# Patient Record
Sex: MALE | Race: WHITE | NOT HISPANIC OR LATINO
[De-identification: names, ages, dates, MRNs, and addresses within clinical notes are randomized per-mention and may not be internally consistent; named-entity substitution may affect disease eponyms.]

---

## 2020-03-06 PROBLEM — Z86.69 HISTORY OF VIRAL CONJUNCTIVITIS: Status: RESOLVED | Noted: 2020-03-06 | Resolved: 2020-03-06

## 2020-03-06 PROBLEM — Z87.19 HISTORY OF IRRITABLE BOWEL SYNDROME: Status: RESOLVED | Noted: 2020-03-06 | Resolved: 2020-03-06

## 2020-03-06 PROBLEM — D89.2 HYPERGAMMAGLOBULINEMIA: Status: RESOLVED | Noted: 2020-03-06 | Resolved: 2020-03-06

## 2020-03-06 PROBLEM — K44.9 DIAPHRAGMATIC HERNIA: Status: RESOLVED | Noted: 2020-03-06 | Resolved: 2020-03-06

## 2020-03-06 PROBLEM — H25.10 SENILE NUCLEAR SCLEROSIS: Status: RESOLVED | Noted: 2020-03-06 | Resolved: 2020-03-06

## 2020-03-06 PROBLEM — E88.09 HYPERPROTEINEMIA: Status: RESOLVED | Noted: 2020-03-06 | Resolved: 2020-03-06

## 2020-03-06 PROBLEM — Z86.79 HISTORY OF ISCHEMIC HEART DISEASE: Status: RESOLVED | Noted: 2020-03-06 | Resolved: 2020-03-06

## 2020-03-06 PROBLEM — Z87.2 HISTORY OF PSORIASIS: Status: RESOLVED | Noted: 2020-03-06 | Resolved: 2020-03-06

## 2020-03-06 PROBLEM — R74.8 ELEVATED CK: Status: RESOLVED | Noted: 2020-03-06 | Resolved: 2020-03-06

## 2020-03-06 PROBLEM — Z86.39 HISTORY OF HYPERLIPIDEMIA: Status: RESOLVED | Noted: 2020-03-06 | Resolved: 2020-03-06

## 2020-03-06 PROBLEM — Z86.2 HISTORY OF ANEMIA: Status: RESOLVED | Noted: 2020-03-06 | Resolved: 2020-03-06

## 2020-03-06 PROBLEM — Z00.00 ENCOUNTER FOR PREVENTIVE HEALTH EXAMINATION: Status: ACTIVE | Noted: 2020-03-06

## 2020-03-06 PROBLEM — Z86.39 HISTORY OF VITAMIN D DEFICIENCY: Status: RESOLVED | Noted: 2020-03-06 | Resolved: 2020-03-06

## 2020-03-06 PROBLEM — Z87.898 HISTORY OF ELEVATED PROSTATE SPECIFIC ANTIGEN (PSA): Status: RESOLVED | Noted: 2020-03-06 | Resolved: 2020-03-06

## 2020-03-06 PROBLEM — Z87.898 HISTORY OF INSOMNIA: Status: RESOLVED | Noted: 2020-03-06 | Resolved: 2020-03-06

## 2020-03-06 PROBLEM — H04.129 DRY EYE: Status: RESOLVED | Noted: 2020-03-06 | Resolved: 2020-03-06

## 2020-03-09 ENCOUNTER — APPOINTMENT (OUTPATIENT)
Dept: RADIATION ONCOLOGY | Facility: CLINIC | Age: 85
End: 2020-03-09
Payer: MEDICARE

## 2020-03-09 VITALS
RESPIRATION RATE: 12 BRPM | HEIGHT: 69 IN | TEMPERATURE: 97.2 F | DIASTOLIC BLOOD PRESSURE: 76 MMHG | HEART RATE: 54 BPM | WEIGHT: 196 LBS | SYSTOLIC BLOOD PRESSURE: 160 MMHG | BODY MASS INDEX: 29.03 KG/M2 | OXYGEN SATURATION: 99 %

## 2020-03-09 DIAGNOSIS — Z86.79 PERSONAL HISTORY OF OTHER DISEASES OF THE CIRCULATORY SYSTEM: ICD-10-CM

## 2020-03-09 DIAGNOSIS — Z87.891 PERSONAL HISTORY OF NICOTINE DEPENDENCE: ICD-10-CM

## 2020-03-09 DIAGNOSIS — H25.10 AGE-RELATED NUCLEAR CATARACT, UNSPECIFIED EYE: ICD-10-CM

## 2020-03-09 DIAGNOSIS — K44.9 DIAPHRAGMATIC HERNIA W/OUT OBSTRUCTION OR GANGRENE: ICD-10-CM

## 2020-03-09 DIAGNOSIS — Z87.2 PERSONAL HISTORY OF DISEASES OF THE SKIN AND SUBCUTANEOUS TISSUE: ICD-10-CM

## 2020-03-09 DIAGNOSIS — D89.2 HYPERGAMMAGLOBULINEMIA, UNSPECIFIED: ICD-10-CM

## 2020-03-09 DIAGNOSIS — Z86.2 PERSONAL HISTORY OF DISEASES OF THE BLOOD AND BLOOD-FORMING ORGANS AND CERTAIN DISORDERS INVOLVING THE IMMUNE MECHANISM: ICD-10-CM

## 2020-03-09 DIAGNOSIS — Z86.39 PERSONAL HISTORY OF OTHER ENDOCRINE, NUTRITIONAL AND METABOLIC DISEASE: ICD-10-CM

## 2020-03-09 DIAGNOSIS — Z86.69 PERSONAL HISTORY OF OTHER DISEASES OF THE NERVOUS SYSTEM AND SENSE ORGANS: ICD-10-CM

## 2020-03-09 DIAGNOSIS — Z87.19 PERSONAL HISTORY OF OTHER DISEASES OF THE DIGESTIVE SYSTEM: ICD-10-CM

## 2020-03-09 DIAGNOSIS — E88.09 OTHER DISORDERS OF PLASMA-PROTEIN METABOLISM, NOT ELSEWHERE CLASSIFIED: ICD-10-CM

## 2020-03-09 DIAGNOSIS — R74.8 ABNORMAL LEVELS OF OTHER SERUM ENZYMES: ICD-10-CM

## 2020-03-09 DIAGNOSIS — H04.129 DRY EYE SYNDROME OF UNSPECIFIED LACRIMAL GLAND: ICD-10-CM

## 2020-03-09 DIAGNOSIS — Z87.898 PERSONAL HISTORY OF OTHER SPECIFIED CONDITIONS: ICD-10-CM

## 2020-03-09 DIAGNOSIS — Z78.9 OTHER SPECIFIED HEALTH STATUS: ICD-10-CM

## 2020-03-09 PROCEDURE — 99024 POSTOP FOLLOW-UP VISIT: CPT

## 2020-03-09 PROCEDURE — 99213 OFFICE O/P EST LOW 20 MIN: CPT

## 2020-03-09 NOTE — HISTORY OF PRESENT ILLNESS
[FreeTextEntry1] : 09/09/19 FOLLOW UP NOTE- Dr. Miranda\par \par SUMMARY:  The patient is an 84-year-old  male with adenocarcinoma of\par the prostate, clinical stage T2b N0 M0, initial PSA 13 and Alta score of\par 4+3=7, who was treated with a combined adjuvant brachytherapy along with\par radiation therapy to a dose of 8100 cGy which he completed in February of 2019.\par The patient continues to get adjuvant hormonal therapy. He denies any history\par of hot flashes. He notes that he begins to have slightly increased frequency of\par urination with nocturia x2. He denies history of hematuria. No history of\par abdominal discomfort or rectal bleed. The patient has his PSA repeated two\par weeks ago, that was undetectable. \par \par PHYSICAL EXAMINATION: Adult make, well built and nourished, not in distress.\par Vitals stable. Performance status 80. Pain score 0. HEENT: No pallor. No\par icterus. PERRLA. Neck: No neck nodes. Chest: Lungs clear on auscultation. S1\par and S2 heard normally. Abdomen: Soft, nontender. No hepatomegaly. Rectal\par examination: Good sphincter tone. Prostate small, firm, nontender, no palpable\par nodules. Rectal: Mucosa smooth. No blood on examining finger. Musculoskeletal\par system: No bony tenderness noted.\par \par ASSESSMENT AND PLAN: Adenocarcinoma of the prostate, clinical stage T2b, status\par post hormonal therapy and radiation therapy completed six months ago. The\par patient has minimal symptoms and remains free of disease. He will continue his\par follow up with WARREN D. BROMBERG, MD, [facility]. We will see him in follow up\par clinic again in six months with repeat PSA.  \par __\par Received a dose of 4600 cGy in twenty-three 200 cGy fractions to the\par pelvis over 31 elapsed days from January 4, 2019 to February 4, 2019.\par Thereafter, he received a boost to the prostate using external beam\par radiation in seventeen 200 cGy fractions over 23 elapsed days from February 5,\par 2019 to February 28, 2019 to 3400 cGy, thus making the total dose to the\par prostate 8000 cGy. \par \par Patient returns for follow-up visit today.  He denies any history of hematuria or burning sensation during urination.  He has minimally increased frequency of urination with nocturia x1.  Patient does wear 1 pad in the morning.  Denies history of abdominal discomfort or rectal bleed\par \par \par PSA drawn today 3/9/20\par \par Prostate cancer urinary activity score- 11\par EPIC-CP score- 6\par \par Global fatigue score:4.6\par Fatigue Interference score:1 \par Fatigue severity:2.2\par

## 2020-03-09 NOTE — VITALS
[Maximal Pain Intensity: 0/10] : 0/10 [Least Pain Intensity: 0/10] : 0/10 [NoTreatment Scheduled] : no treatment scheduled [80: Active, but tires more quickly] : 80: Active, but tires more quickly [ECOG Performance Status: 2 - Ambulatory and capable of all self care but unable to carry out any work activities] : Performance Status: 2 - Ambulatory and capable of all self care but unable to carry out any work activities. Up and about more than 50% of waking hours

## 2020-03-09 NOTE — PHYSICAL EXAM
[Normal] : oriented to person, place and time, the affect was normal, the mood was normal and not anxious [FreeTextEntry1] : No blood on examining finger

## 2020-03-09 NOTE — REVIEW OF SYSTEMS
[Hematuria: Grade 0] : Hematuria: Grade 0 [Urinary Incontinence: Grade 1 - Occasional (e.g., with coughing, sneezing, etc.), pads not indicated] : Urinary Incontinence: Grade 1 - Occasional (e.g., with coughing, sneezing, etc.), pads not indicated [Urinary Retention: Grade 0] : Urinary Retention: Grade 0 [Urinary Tract Pain: Grade 0] : Urinary Tract Pain: Grade 0 [Urinary Urgency: Grade 1 - Present] : Urinary Urgency: Grade 1 - Present [Urinary Frequency: Grade 1 - Present] : Urinary Frequency: Grade 1 - Present [FreeTextEntry7] : not sexually active

## 2020-09-14 ENCOUNTER — APPOINTMENT (OUTPATIENT)
Dept: RADIATION ONCOLOGY | Facility: CLINIC | Age: 85
End: 2020-09-14
Payer: MEDICARE

## 2020-09-14 VITALS
SYSTOLIC BLOOD PRESSURE: 148 MMHG | TEMPERATURE: 98 F | RESPIRATION RATE: 12 BRPM | BODY MASS INDEX: 28.44 KG/M2 | OXYGEN SATURATION: 99 % | WEIGHT: 192 LBS | HEART RATE: 60 BPM | HEIGHT: 69 IN | DIASTOLIC BLOOD PRESSURE: 74 MMHG

## 2020-09-14 DIAGNOSIS — Z92.3 PERSONAL HISTORY OF IRRADIATION: ICD-10-CM

## 2020-09-14 PROCEDURE — 99213 OFFICE O/P EST LOW 20 MIN: CPT

## 2020-09-14 NOTE — HISTORY OF PRESENT ILLNESS
[FreeTextEntry1] : Mr. Garcia is a 85 year old made with adenocarcinoma of the prostate, clinical stage T2b, Alta score 7 and a pretreatment PSA of 13. He received a dose of 4600 cGy in twenty-three 200 cGy fractions from January 4, 2019 to February 4, 2019. Thereafter, he received a boost to the prostate using external beam radiation in 17 fractions of  200 cGy fractions from February 5, 2019 to February 28, 2019 to 3400 cGy, thus making the total dose to the prostate 8000 cGy. \par \par He was last seen in our office on 3/9/2020 and he was clinically doing well. PSA on 3/9/20 was  0.02. He is here today for a follow up. He states he is having frequency and urgency. His sleep is interrupted due to nocturia x4 per night. Dr. Bromberg is his urologist and Dr. Song (primary MD) started him on Flomax was started 2 months ago, he stopped it and has now restarted it.\par Patient continues to take care of his wife who has dementia and is legally blind at home, he was recently able to get a live in aide to assist them with care. \par \par PSA was drawn today \par \par Prostate cancer urinary activity score- 13\par EPIC-CP score- 16\par \par

## 2020-09-14 NOTE — VITALS
[Maximal Pain Intensity: 0/10] : 0/10 [Least Pain Intensity: 0/10] : 0/10 [80: Normal activity with effort; some signs or symptoms of disease.] : 80: Normal activity with effort; some signs or symptoms of disease.  [80: Active, but tires more quickly] : 80: Active, but tires more quickly [ECOG Performance Status: 1 - Restricted in physically strenuous activity but ambulatory and able to carry out work of a light or sedentary nature] : Performance Status: 1 - Restricted in physically strenuous activity but ambulatory and able to carry out work of a light or sedentary nature, e.g., light house work, office work

## 2020-09-14 NOTE — REVIEW OF SYSTEMS
[Constipation: Grade 0] : Constipation: Grade 0 [Anal Pain: Grade 0] : Anal Pain: Grade 0 [Diarrhea: Grade 0] : Diarrhea: Grade 0 [Dyspepsia: Grade 0] : Dyspepsia: Grade 0 [Esophagitis: Grade 0] : Esophagitis: Grade 0 [Fecal Incontinence: Grade 0] : Fecal Incontinence: Grade 0 [Dysphagia: Grade 0] : Dysphagia: Grade 0 [Nausea: Grade 0] : Nausea: Grade 0 [Gastroparesis: Grade 0] : Gastroparesis: Grade 0 [Rectal Pain: Grade 0] : Rectal Pain: Grade 0 [Proctitis: Grade 1 - Rectal discomfort, intervention not indicated] : Proctitis: Grade 1 - Rectal discomfort, intervention not indicated [Small Intestinal Obstruction: Grade 0] : Small Intestinal Obstruction: Grade 0 [Vomiting: Grade 0] : Vomiting: Grade 0 [Hematuria: Grade 0] : Hematuria: Grade 0 [Urinary Incontinence: Grade 1 - Occasional (e.g., with coughing, sneezing, etc.), pads not indicated] : Urinary Incontinence: Grade 1 - Occasional (e.g., with coughing, sneezing, etc.), pads not indicated [Urinary Retention: Grade 0] : Urinary Retention: Grade 0 [Urinary Urgency: Grade 1 - Present] : Urinary Urgency: Grade 1 - Present [Urinary Tract Pain: Grade 0] : Urinary Tract Pain: Grade 0 [Urinary Frequency: Grade 1 - Present] : Urinary Frequency: Grade 1 - Present [Insomnia: Grade 1 - Mild difficulty falling asleep, staying asleep or waking up early] : Insomnia: Grade 1 - Mild difficulty falling asleep, staying asleep or waking up early [de-identified] : discomfort and pressure feeling [FreeTextEntry2] : wears brief for comfort and involuntary dribbling [FreeTextEntry7] : not sexually active [FreeTextEntry3] : due to nocturia 4 times

## 2020-09-14 NOTE — DISEASE MANAGEMENT
[Clinical] : TNM Stage: c [II] : II [TTNM] : 2 [NTNM] : 0 [de-identified] : He received a dose of 4600 cGy in twenty-three 200 cGy fractions from January 4, 2019 to February 4, 2019. Thereafter, he received a boost to the prostate using external beam radiation in 17 fractions of  200 cGy fractions from February 5, 2019 to February 28, 2019 to 3400 cGy, thus making the total dose to the prostate 8000 cGy.  [MTNM] : 0

## 2020-09-14 NOTE — PHYSICAL EXAM
[Normal] : oriented to person, place and time, the affect was normal, the mood was normal and not anxious [FreeTextEntry1] : Prostate small, firm, nontender.  No palpable nodules.  No blood in examining finger

## 2021-03-15 ENCOUNTER — APPOINTMENT (OUTPATIENT)
Dept: RADIATION ONCOLOGY | Facility: CLINIC | Age: 86
End: 2021-03-15
Payer: MEDICARE

## 2021-03-15 VITALS
SYSTOLIC BLOOD PRESSURE: 164 MMHG | OXYGEN SATURATION: 100 % | DIASTOLIC BLOOD PRESSURE: 88 MMHG | TEMPERATURE: 98 F | BODY MASS INDEX: 27.85 KG/M2 | RESPIRATION RATE: 16 BRPM | HEART RATE: 60 BPM | WEIGHT: 188 LBS | HEIGHT: 69 IN

## 2021-03-15 DIAGNOSIS — Z86.79 PERSONAL HISTORY OF OTHER DISEASES OF THE CIRCULATORY SYSTEM: ICD-10-CM

## 2021-03-15 PROCEDURE — 99072 ADDL SUPL MATRL&STAF TM PHE: CPT

## 2021-03-15 PROCEDURE — 99212 OFFICE O/P EST SF 10 MIN: CPT

## 2021-03-15 NOTE — REVIEW OF SYSTEMS
[Fatigue: Grade 1 - Fatigue relieved by rest] : Fatigue: Grade 1 - Fatigue relieved by rest [Hematuria: Grade 0] : Hematuria: Grade 0 [Urinary Incontinence: Grade 1 - Occasional (e.g., with coughing, sneezing, etc.), pads not indicated] : Urinary Incontinence: Grade 1 - Occasional (e.g., with coughing, sneezing, etc.), pads not indicated [Urinary Retention: Grade 1 - Urinary, suprapubic or intermittent catheter placement not indicated; able to void with some residual] : Urinary Retention: Grade 1 - Urinary, suprapubic or intermittent catheter placement not indicated; able to void with some residual [Urinary Tract Pain: Grade 0] : Urinary Tract Pain: Grade 0 [Urinary Urgency: Grade 1 - Present] : Urinary Urgency: Grade 1 - Present [Urinary Frequency: Grade 1 - Present] : Urinary Frequency: Grade 1 - Present

## 2021-03-15 NOTE — PHYSICAL EXAM
[Normal] : normal external genitalia without lesions and no testicular masses [Normal] : oriented to person, place and time, the affect was normal, the mood was normal and not anxious [FreeTextEntry1] : Prostate small, firm, nontender.  No blood in examining finger

## 2021-03-15 NOTE — HISTORY OF PRESENT ILLNESS
[FreeTextEntry1] : Mr. Garcia is a 85 year old made with adenocarcinoma of the prostate, clinical stage T2b, Alta score 7 and a pretreatment PSA of 13. He received a dose of 4600 cGy in twenty-three 200 cGy fractions from January 4, 2019 to February 4, 2019. Thereafter, he received a boost to the prostate using external beam radiation in 17 fractions of  200 cGy fractions from February 5, 2019 to February 28, 2019 to 3400 cGy, thus making the total dose to the prostate 8000 cGy. \par \par He was last seen in our office on 3/9/2020 and he was clinically doing well. PSA on 3/9/20 was  0.02. He is here today for a follow up. He states he is having frequency and urgency. His sleep is interrupted due to nocturia x4 per night. Dr. Bromberg is his urologist and Dr. Song (primary MD) started him on Flomax was started 2 months ago, he stopped it and has now restarted it.\par Patient continues to take care of his wife who has dementia and is legally blind at home, he was recently able to get a live in aide to assist them with care. \par \par PSA was drawn today \par \par Prostate cancer urinary activity score- 13\par EPIC-CP score- 16\par \par 3/15/2021 \par Mr. Garcia presents today for a 6 month f/u. He was last seen in our office on 9/14/2020. \par Mr. Garcia saw Dr. Bromberg in the fall and PSA was < 0.01. He has since stopped the Flomax. He is here today for a follow up. He states he is having frequency and urgency. He does wear a pad for overflow but that has been consistent.  His sleep is interrupted due to nocturia x 3- 4 per night. His stream is slow to start and weak. His appetite is good and weight is stable. \par AUA- 13\par EPIC-CP- 20

## 2021-03-15 NOTE — DISEASE MANAGEMENT
[Clinical] : TNM Stage: c [II] : II [TTNM] : 2 [NTNM] : 0 [MTNM] : 0 [de-identified] : He received a dose of 4600 cGy in twenty-three 200 cGy fractions from January 4, 2019 to February 4, 2019. Thereafter, he received a boost to the prostate using external beam radiation in 17 fractions of  200 cGy fractions from February 5, 2019 to February 28, 2019 to 3400 cGy, thus making the total dose to the prostate 8000 cGy.

## 2022-12-02 RX ORDER — METOPROLOL TARTRATE 25 MG/1
25 TABLET, FILM COATED ORAL
Refills: 0 | Status: DISCONTINUED | COMMUNITY
End: 2022-12-02

## 2022-12-02 RX ORDER — AMLODIPINE BESYLATE 10 MG/1
10 TABLET ORAL
Refills: 0 | Status: DISCONTINUED | COMMUNITY
End: 2022-12-02

## 2022-12-05 ENCOUNTER — APPOINTMENT (OUTPATIENT)
Dept: HEART AND VASCULAR | Facility: CLINIC | Age: 87
End: 2022-12-05

## 2022-12-05 VITALS
BODY MASS INDEX: 25.48 KG/M2 | OXYGEN SATURATION: 95 % | HEART RATE: 74 BPM | HEIGHT: 69 IN | DIASTOLIC BLOOD PRESSURE: 70 MMHG | WEIGHT: 172 LBS | SYSTOLIC BLOOD PRESSURE: 126 MMHG

## 2022-12-05 DIAGNOSIS — I34.0 NONRHEUMATIC MITRAL (VALVE) INSUFFICIENCY: ICD-10-CM

## 2022-12-05 PROCEDURE — 99204 OFFICE O/P NEW MOD 45 MIN: CPT

## 2022-12-05 NOTE — PHYSICAL EXAM
[No Acute Distress] : no acute distress [Frail] : frail [Normal Conjunctiva] : normal conjunctiva [Normal Venous Pressure] : normal venous pressure [No Carotid Bruit] : no carotid bruit [Normal S1, S2] : normal S1, S2 [Good Air Entry] : good air entry [No Respiratory Distress] : no respiratory distress  [Soft] : abdomen soft [Non Tender] : non-tender [No Masses/organomegaly] : no masses/organomegaly [Normal Bowel Sounds] : normal bowel sounds [Abnormal Gait] : abnormal gait [No Edema] : no edema [No Cyanosis] : no cyanosis [No Clubbing] : no clubbing [No Varicosities] : no varicosities [No Rash] : no rash [No Skin Lesions] : no skin lesions [Moves all extremities] : moves all extremities [No Focal Deficits] : no focal deficits [Normal Speech] : normal speech [Alert and Oriented] : alert and oriented [Normal memory] : normal memory [de-identified] : +holosystolic murmur, irregular HR [de-identified] : +crackles at LLL>RLL

## 2022-12-05 NOTE — ASSESSMENT
[FreeTextEntry1] : Chronic diastolic CHF/severe MR/HTN/HLD\par -he appears to have some pulmonary congestions-> starting lasix 40mg daily (x5 days)\par -check labs, BNP today\par -CXR PA/lateral\par -BP relatively controlled, HR WNL\par -His mitral valve is severe and he meets indication for transcatheter repair\par -we discussed at length options for severe MR, including transcatheter mitral valve repair vs surgery vs medical management. Due to his age and comorbidities, patient appears to be a good candidate for TMVr with the MitraClip. We discussed benefits/risks of the MitraClip and will plan for transcatheter repair pending testing. Will plan for cardiac cath with mitraclip (if pt is anatomical candidate for Mitraclip).\par -MASOUD at [facility] (with Dr. Cameron), carotid US\par -TEB with Dr. Bowen\par -continue eliquis (Aflutter/fib), HR controlled\par

## 2022-12-05 NOTE — REVIEW OF SYSTEMS
[Feeling Fatigued] : feeling fatigued [Seeing Double (Diplopia)] : no diplopia [Earache] : no earache [Dyspnea on exertion] : dyspnea during exertion [Chest Discomfort] : no chest discomfort [Lower Ext Edema] : no extremity edema [Syncope] : no syncope [Cough] : no cough [Abdominal Pain] : no abdominal pain [Urinary Frequency] : no change in urinary frequency [Joint Pain] : no joint pain [Dizziness] : no dizziness [Confusion] : no confusion was observed [Easy Bleeding] : no tendency for easy bleeding

## 2022-12-05 NOTE — REASON FOR VISIT
[FreeTextEntry1] : 88 y/o M with pmhx of permanent afib/flutter (on eliquis), PD, poor functional status (uses walker for balance), HTN, HLD, chronic diastolic heart failure, severe MR who presents for SHD evaluation\par Pt endorses NYHA III symptoms\par No LE edema\par No chest pains\par Worsening TEE over past few months\par No CHF hospitalizaitons\par He lives at home and has a nurse care for his wife. He is indepenedent with ADLs.\par Uses a walker for gait/balance issues.\par \par Testing/Imaging Reviewed:\par EKG 11/2022- aflutter, irregular HR\par \par TTE 11/2022-\par -LVEF 55%\par -RV size mildly dilated, RV function mildly reduced\par -moderate AI\par -severe MR with posterior MV prolapst\par -Moderate TR, PASP 52mmHg\par \par

## 2022-12-07 LAB
ALBUMIN SERPL ELPH-MCNC: 4.5 G/DL
ALP BLD-CCNC: 101 U/L
ALT SERPL-CCNC: 5 U/L
ANION GAP SERPL CALC-SCNC: 17 MMOL/L
APTT BLD: 39.4 SEC
AST SERPL-CCNC: 23 U/L
BASOPHILS # BLD AUTO: 0.03 K/UL
BASOPHILS NFR BLD AUTO: 0.4 %
BILIRUB SERPL-MCNC: 0.6 MG/DL
BUN SERPL-MCNC: 22 MG/DL
CALCIUM SERPL-MCNC: 9.8 MG/DL
CHLORIDE SERPL-SCNC: 99 MMOL/L
CO2 SERPL-SCNC: 22 MMOL/L
CREAT SERPL-MCNC: 0.72 MG/DL
EGFR: 90 ML/MIN/1.73M2
EOSINOPHIL # BLD AUTO: 0.05 K/UL
EOSINOPHIL NFR BLD AUTO: 0.7 %
GLUCOSE SERPL-MCNC: 120 MG/DL
HCT VFR BLD CALC: 38.7 %
HGB BLD-MCNC: 12.7 G/DL
IMM GRANULOCYTES NFR BLD AUTO: 0.4 %
INR PPP: 1.54 RATIO
LYMPHOCYTES # BLD AUTO: 0.52 K/UL
LYMPHOCYTES NFR BLD AUTO: 7.5 %
MAN DIFF?: NORMAL
MCHC RBC-ENTMCNC: 31.2 PG
MCHC RBC-ENTMCNC: 32.8 GM/DL
MCV RBC AUTO: 95.1 FL
MONOCYTES # BLD AUTO: 0.48 K/UL
MONOCYTES NFR BLD AUTO: 6.9 %
NEUTROPHILS # BLD AUTO: 5.84 K/UL
NEUTROPHILS NFR BLD AUTO: 84.1 %
NT-PROBNP SERPL-MCNC: 2280 PG/ML
PLATELET # BLD AUTO: 237 K/UL
POTASSIUM SERPL-SCNC: 4.1 MMOL/L
PROT SERPL-MCNC: 7.1 G/DL
PT BLD: 17.9 SEC
RBC # BLD: 4.07 M/UL
RBC # FLD: 14.4 %
SODIUM SERPL-SCNC: 137 MMOL/L
WBC # FLD AUTO: 6.95 K/UL

## 2022-12-09 ENCOUNTER — NON-APPOINTMENT (OUTPATIENT)
Age: 87
End: 2022-12-09

## 2022-12-11 ENCOUNTER — RESULT REVIEW (OUTPATIENT)
Age: 87
End: 2022-12-11

## 2022-12-19 ENCOUNTER — NON-APPOINTMENT (OUTPATIENT)
Age: 87
End: 2022-12-19

## 2023-01-13 ENCOUNTER — APPOINTMENT (OUTPATIENT)
Dept: CARDIOTHORACIC SURGERY | Facility: CLINIC | Age: 88
End: 2023-01-13
Payer: MEDICARE

## 2023-01-13 PROCEDURE — 99202 OFFICE O/P NEW SF 15 MIN: CPT

## 2023-01-18 NOTE — PHYSICAL EXAM
[No Acute Distress] : no acute distress [Frail] : frail [Normal Conjunctiva] : normal conjunctiva [Normal Venous Pressure] : normal venous pressure [No Carotid Bruit] : no carotid bruit [Normal S1, S2] : normal S1, S2 [Good Air Entry] : good air entry [No Respiratory Distress] : no respiratory distress  [Soft] : abdomen soft [Non Tender] : non-tender [No Masses/organomegaly] : no masses/organomegaly [Normal Bowel Sounds] : normal bowel sounds [Abnormal Gait] : abnormal gait [No Edema] : no edema [No Cyanosis] : no cyanosis [No Clubbing] : no clubbing [No Varicosities] : no varicosities [No Rash] : no rash [No Skin Lesions] : no skin lesions [Moves all extremities] : moves all extremities [No Focal Deficits] : no focal deficits [Normal Speech] : normal speech [Alert and Oriented] : alert and oriented [Normal memory] : normal memory [de-identified] : +holosystolic murmur, irregular HR [de-identified] : +crackles at LLL>RLL

## 2023-01-18 NOTE — HISTORY OF PRESENT ILLNESS
[FreeTextEntry1] : 88 y/o M with pmhx of permanent afib/flutter (on eliquis), Parkinson's disease, poor functional status (uses walker for balance), HTN, HLD, chronic diastolic heart failure, severe MR who presents for surgical evaluation. \par \par Pt endorses NYHA III symptoms\par Worsening TEE over past few months\par \par TTE 11/2022-\par -LVEF 55%\par -RV size mildly dilated, RV function mildly reduced\par -moderate AI\par -severe MR with posterior MV prolapse\par -Moderate TR, PASP 52mmg\par \par MASOUD on 12/22/2022- 1. Left ventricular systolic function is normal without regional wall motion abnormalities.\par 2. Severely dilated left atrium.3. No evidence of left atrial or left atrial appendage thrombus. The left atrialappendage emptying velocity is low at 22 cm/s.4. Agitated saline injection was negative for intracardiac shunt.5. 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 Prolapse of the middle scallop (P2) of the posterior mitral leaflet.7. Severe mitral valve regurgitation at a blood pressure of 160/100 mmHg.8. 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 Eccentric anteriorly directed wall hugging mitral jet. EROA 48 cm2.  ml.10. Aortic root at sinuses of Valsalva is mildly dilated.11. Moderate aortic regurgitation.12. Mild tricuspid regurgitation.13. Estimated pulmonary artery systolic pressure is 38 mmHg, consistent with there is borderline pulmonary hypertension.\par \par The patient lives at home, however does have a home health aide \par

## 2023-01-20 ENCOUNTER — NON-APPOINTMENT (OUTPATIENT)
Age: 88
End: 2023-01-20

## 2023-01-23 ENCOUNTER — LABORATORY RESULT (OUTPATIENT)
Age: 88
End: 2023-01-23

## 2023-01-25 ENCOUNTER — NON-APPOINTMENT (OUTPATIENT)
Age: 88
End: 2023-01-25

## 2023-01-25 VITALS
OXYGEN SATURATION: 96 % | DIASTOLIC BLOOD PRESSURE: 91 MMHG | HEART RATE: 78 BPM | TEMPERATURE: 97 F | SYSTOLIC BLOOD PRESSURE: 177 MMHG | HEIGHT: 68 IN | RESPIRATION RATE: 20 BRPM | WEIGHT: 169.98 LBS

## 2023-01-25 NOTE — PATIENT PROFILE ADULT - FALL HARM RISK - RISK INTERVENTIONS

## 2023-01-25 NOTE — PRE-OP CHECKLIST - SELECT TESTS ORDERED
COVID-19 echo/CBC/PT/PTT/INR/Type and Screen/COVID-19 echo/CBC/PT/PTT/INR/Type and Screen/EKG/COVID-19

## 2023-01-26 ENCOUNTER — INPATIENT (INPATIENT)
Facility: HOSPITAL | Age: 88
LOS: 1 days | Discharge: HOME | DRG: 267 | End: 2023-01-28
Attending: INTERNAL MEDICINE | Admitting: INTERNAL MEDICINE
Payer: MEDICARE

## 2023-01-26 ENCOUNTER — APPOINTMENT (OUTPATIENT)
Dept: CARDIOTHORACIC SURGERY | Facility: HOSPITAL | Age: 88
End: 2023-01-26

## 2023-01-26 DIAGNOSIS — Z90.89 ACQUIRED ABSENCE OF OTHER ORGANS: Chronic | ICD-10-CM

## 2023-01-26 LAB
ALBUMIN SERPL ELPH-MCNC: 4.1 G/DL — SIGNIFICANT CHANGE UP (ref 3.3–5)
ALP SERPL-CCNC: 122 U/L — HIGH (ref 40–120)
ALT FLD-CCNC: 11 U/L — SIGNIFICANT CHANGE UP (ref 10–45)
ANION GAP SERPL CALC-SCNC: 10 MMOL/L — SIGNIFICANT CHANGE UP (ref 5–17)
ANION GAP SERPL CALC-SCNC: 10 MMOL/L — SIGNIFICANT CHANGE UP (ref 5–17)
APTT BLD: 38.2 SEC — HIGH (ref 27.5–35.5)
AST SERPL-CCNC: 21 U/L — SIGNIFICANT CHANGE UP (ref 10–40)
BILIRUB SERPL-MCNC: 0.7 MG/DL — SIGNIFICANT CHANGE UP (ref 0.2–1.2)
BUN SERPL-MCNC: 18 MG/DL — SIGNIFICANT CHANGE UP (ref 7–23)
BUN SERPL-MCNC: 19 MG/DL — SIGNIFICANT CHANGE UP (ref 7–23)
CALCIUM SERPL-MCNC: 9.6 MG/DL — SIGNIFICANT CHANGE UP (ref 8.4–10.5)
CALCIUM SERPL-MCNC: 9.7 MG/DL — SIGNIFICANT CHANGE UP (ref 8.4–10.5)
CHLORIDE SERPL-SCNC: 100 MMOL/L — SIGNIFICANT CHANGE UP (ref 96–108)
CHLORIDE SERPL-SCNC: 102 MMOL/L — SIGNIFICANT CHANGE UP (ref 96–108)
CO2 SERPL-SCNC: 25 MMOL/L — SIGNIFICANT CHANGE UP (ref 22–31)
CO2 SERPL-SCNC: 26 MMOL/L — SIGNIFICANT CHANGE UP (ref 22–31)
CREAT SERPL-MCNC: 0.64 MG/DL — SIGNIFICANT CHANGE UP (ref 0.5–1.3)
CREAT SERPL-MCNC: 0.66 MG/DL — SIGNIFICANT CHANGE UP (ref 0.5–1.3)
EGFR: 92 ML/MIN/1.73M2 — SIGNIFICANT CHANGE UP
EGFR: 93 ML/MIN/1.73M2 — SIGNIFICANT CHANGE UP
GLUCOSE BLDC GLUCOMTR-MCNC: 97 MG/DL — SIGNIFICANT CHANGE UP (ref 70–99)
GLUCOSE SERPL-MCNC: 100 MG/DL — HIGH (ref 70–99)
GLUCOSE SERPL-MCNC: 103 MG/DL — HIGH (ref 70–99)
HCT VFR BLD CALC: 40.2 % — SIGNIFICANT CHANGE UP (ref 39–50)
HGB BLD-MCNC: 13.6 G/DL — SIGNIFICANT CHANGE UP (ref 13–17)
INR BLD: 1.19 — HIGH (ref 0.88–1.16)
MCHC RBC-ENTMCNC: 31.6 PG — SIGNIFICANT CHANGE UP (ref 27–34)
MCHC RBC-ENTMCNC: 33.8 GM/DL — SIGNIFICANT CHANGE UP (ref 32–36)
MCV RBC AUTO: 93.3 FL — SIGNIFICANT CHANGE UP (ref 80–100)
NRBC # BLD: 0 /100 WBCS — SIGNIFICANT CHANGE UP (ref 0–0)
NT-PROBNP SERPL-SCNC: 1923 PG/ML — HIGH (ref 0–300)
PLATELET # BLD AUTO: 229 K/UL — SIGNIFICANT CHANGE UP (ref 150–400)
POTASSIUM SERPL-MCNC: 4.4 MMOL/L — SIGNIFICANT CHANGE UP (ref 3.5–5.3)
POTASSIUM SERPL-MCNC: SIGNIFICANT CHANGE UP (ref 3.5–5.3)
POTASSIUM SERPL-SCNC: 4.4 MMOL/L — SIGNIFICANT CHANGE UP (ref 3.5–5.3)
POTASSIUM SERPL-SCNC: SIGNIFICANT CHANGE UP (ref 3.5–5.3)
PROT SERPL-MCNC: 7.8 G/DL — SIGNIFICANT CHANGE UP (ref 6–8.3)
PROTHROM AB SERPL-ACNC: 14.2 SEC — HIGH (ref 10.5–13.4)
RBC # BLD: 4.31 M/UL — SIGNIFICANT CHANGE UP (ref 4.2–5.8)
RBC # FLD: 13.8 % — SIGNIFICANT CHANGE UP (ref 10.3–14.5)
RH IG SCN BLD-IMP: NEGATIVE — SIGNIFICANT CHANGE UP
SODIUM SERPL-SCNC: 136 MMOL/L — SIGNIFICANT CHANGE UP (ref 135–145)
SODIUM SERPL-SCNC: 137 MMOL/L — SIGNIFICANT CHANGE UP (ref 135–145)
WBC # BLD: 7.44 K/UL — SIGNIFICANT CHANGE UP (ref 3.8–10.5)
WBC # FLD AUTO: 7.44 K/UL — SIGNIFICANT CHANGE UP (ref 3.8–10.5)

## 2023-01-26 RX ORDER — AMANTADINE HCL 100 MG
100 CAPSULE ORAL DAILY
Refills: 0 | Status: DISCONTINUED | OUTPATIENT
Start: 2023-01-27 | End: 2023-01-27

## 2023-01-26 RX ORDER — AMANTADINE HCL 100 MG
100 CAPSULE ORAL
Refills: 0 | Status: DISCONTINUED | OUTPATIENT
Start: 2023-01-26 | End: 2023-01-26

## 2023-01-26 RX ORDER — DEXTROSE 50 % IN WATER 50 %
25 SYRINGE (ML) INTRAVENOUS
Refills: 0 | Status: DISCONTINUED | OUTPATIENT
Start: 2023-01-26 | End: 2023-01-26

## 2023-01-26 RX ORDER — TAMSULOSIN HYDROCHLORIDE 0.4 MG/1
1 CAPSULE ORAL
Qty: 0 | Refills: 0 | DISCHARGE

## 2023-01-26 RX ORDER — APIXABAN 2.5 MG/1
1 TABLET, FILM COATED ORAL
Qty: 0 | Refills: 0 | DISCHARGE

## 2023-01-26 RX ORDER — POLYETHYLENE GLYCOL 3350 17 G/17G
17 POWDER, FOR SOLUTION ORAL DAILY
Refills: 0 | Status: DISCONTINUED | OUTPATIENT
Start: 2023-01-26 | End: 2023-01-27

## 2023-01-26 RX ORDER — PANTOPRAZOLE SODIUM 20 MG/1
40 TABLET, DELAYED RELEASE ORAL DAILY
Refills: 0 | Status: DISCONTINUED | OUTPATIENT
Start: 2023-01-26 | End: 2023-01-27

## 2023-01-26 RX ORDER — CARBIDOPA AND LEVODOPA 25; 100 MG/1; MG/1
1 TABLET ORAL THREE TIMES A DAY
Refills: 0 | Status: DISCONTINUED | OUTPATIENT
Start: 2023-01-26 | End: 2023-01-26

## 2023-01-26 RX ORDER — INSULIN HUMAN 100 [IU]/ML
1 INJECTION, SOLUTION SUBCUTANEOUS
Qty: 50 | Refills: 0 | Status: DISCONTINUED | OUTPATIENT
Start: 2023-01-26 | End: 2023-01-26

## 2023-01-26 RX ORDER — PANTOPRAZOLE SODIUM 20 MG/1
40 TABLET, DELAYED RELEASE ORAL DAILY
Refills: 0 | Status: DISCONTINUED | OUTPATIENT
Start: 2023-01-26 | End: 2023-01-26

## 2023-01-26 RX ORDER — CARBIDOPA AND LEVODOPA 25; 100 MG/1; MG/1
4 TABLET ORAL THREE TIMES A DAY
Refills: 0 | Status: DISCONTINUED | OUTPATIENT
Start: 2023-01-26 | End: 2023-01-27

## 2023-01-26 RX ORDER — TRAZODONE HCL 50 MG
2 TABLET ORAL
Qty: 0 | Refills: 0 | DISCHARGE

## 2023-01-26 RX ORDER — SENNA PLUS 8.6 MG/1
2 TABLET ORAL AT BEDTIME
Refills: 0 | Status: DISCONTINUED | OUTPATIENT
Start: 2023-01-26 | End: 2023-01-27

## 2023-01-26 RX ORDER — SODIUM CHLORIDE 9 MG/ML
1000 INJECTION INTRAMUSCULAR; INTRAVENOUS; SUBCUTANEOUS
Refills: 0 | Status: DISCONTINUED | OUTPATIENT
Start: 2023-01-26 | End: 2023-01-27

## 2023-01-26 RX ORDER — OMEPRAZOLE 10 MG/1
1 CAPSULE, DELAYED RELEASE ORAL
Qty: 0 | Refills: 0 | DISCHARGE

## 2023-01-26 RX ORDER — TRAZODONE HCL 50 MG
200 TABLET ORAL AT BEDTIME
Refills: 0 | Status: DISCONTINUED | OUTPATIENT
Start: 2023-01-26 | End: 2023-01-27

## 2023-01-26 RX ORDER — CHLORHEXIDINE GLUCONATE 213 G/1000ML
5 SOLUTION TOPICAL
Refills: 0 | Status: DISCONTINUED | OUTPATIENT
Start: 2023-01-26 | End: 2023-01-26

## 2023-01-26 RX ORDER — MEPERIDINE HYDROCHLORIDE 50 MG/ML
25 INJECTION INTRAMUSCULAR; INTRAVENOUS; SUBCUTANEOUS ONCE
Refills: 0 | Status: DISCONTINUED | OUTPATIENT
Start: 2023-01-26 | End: 2023-01-26

## 2023-01-26 RX ORDER — HEPARIN SODIUM 5000 [USP'U]/ML
5000 INJECTION INTRAVENOUS; SUBCUTANEOUS EVERY 8 HOURS
Refills: 0 | Status: DISCONTINUED | OUTPATIENT
Start: 2023-01-26 | End: 2023-01-27

## 2023-01-26 RX ORDER — DEXTROSE 50 % IN WATER 50 %
50 SYRINGE (ML) INTRAVENOUS
Refills: 0 | Status: DISCONTINUED | OUTPATIENT
Start: 2023-01-26 | End: 2023-01-26

## 2023-01-26 RX ORDER — CEFAZOLIN SODIUM 1 G
2000 VIAL (EA) INJECTION EVERY 8 HOURS
Refills: 0 | Status: DISCONTINUED | OUTPATIENT
Start: 2023-01-26 | End: 2023-01-26

## 2023-01-26 RX ADMIN — HEPARIN SODIUM 5000 UNIT(S): 5000 INJECTION INTRAVENOUS; SUBCUTANEOUS at 14:58

## 2023-01-26 RX ADMIN — Medication 200 MILLIGRAM(S): at 22:22

## 2023-01-26 RX ADMIN — PANTOPRAZOLE SODIUM 40 MILLIGRAM(S): 20 TABLET, DELAYED RELEASE ORAL at 14:58

## 2023-01-26 RX ADMIN — HEPARIN SODIUM 5000 UNIT(S): 5000 INJECTION INTRAVENOUS; SUBCUTANEOUS at 22:22

## 2023-01-26 RX ADMIN — CARBIDOPA AND LEVODOPA 4 TABLET(S): 25; 100 TABLET ORAL at 22:22

## 2023-01-26 NOTE — PROGRESS NOTE ADULT - ASSESSMENT
88 y/o M with pmhx of afib/flutter (on eliquis), Parkinson's disease, poor functional status (uses walker for balance), HTN, HLD, chronic diastolic heart failure, severe MR. Patient is planned to undergo a LHC and mitral clip tomorrow with Dr. Archibald.     Plan:    Neurovascular: Hx parkinsons disease   - Continue Sinemet, Amantadine, Trazadone  -No delirium.   - No pain     Cardiovascular: Chronic diastolic heart failure, severe MR  -Pre-op Mitral clip and LHC tomorrow   - hx of afib: rate controlled, holding Eliquis for OR tomorrow   -Hemodynamically stable. HR controlled.  -Continue to monitor HR, BP, telemetry      Respiratory: Stable  -Oxygenating well on RA 98%  -Encourage coughing and use of IS 10x / hr while awake.    GI: Stable  -PPX: Protonix  -PO Diet: DASH/TLC, NPO after midnight   -Bowel regimen: Miralax    Renal / : stable  -Monitor renal function.  -Monitor I/O's.  -BUN/Cr: 18/0.66    Endocrine:  No hx of DM or thyroid disease     Hematologic: stable  -H/H: 13.6 & 40.2  -Coagulation Panel: PT 14.2 aptt 38.2 INR 1.19    ID: stable  -Temperature: afebrile  -WBC: 7.44  -Observe for SIRS/Sepsis Syndrome.    Prophylaxis:  -DVT prophylaxis with 5000 SubQ Heparin q8h  -Continue with SCD's    Disposition:  OR tomorrow

## 2023-01-26 NOTE — H&P ADULT - ASSESSMENT
88 y/o M with pmhx of afib/flutter (on eliquis), Parkinson's disease, poor functional status (uses walker for balance), HTN, HLD, chronic diastolic heart failure, severe MR.    Pt endorses NYHA III symptoms  Worsening TEE over past few months    TTE 11/2022-  -LVEF 55%  -RV size mildly dilated, RV function mildly reduced  -moderate AI  -severe MR with posterior MV prolapse  -Moderate TR, PASP 52mmg    MASOUD on 12/22/2022- 1. Left ventricular systolic function is normal without regional wall motion abnormalities.  2. Severely dilated left atrium.3. No evidence of left atrial or left atrial appendage thrombus. The left atrialappendage emptying velocity is low at 22 cm/s.4. Agitated saline injection was negative for intracardiac shunt.5. 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 Prolapse of the middle scallop (P2) of the posterior mitral leaflet.7. Severe mitral valve regurgitation at a blood pressure of 160/100 mmHg.8. 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 Eccentric anteriorly directed wall hugging mitral jet. EROA 48 cm2.  ml.10. Aortic root at sinuses of Valsalva is mildly dilated.11. Moderate aortic regurgitation.12. Mild tricuspid regurgitation.13. Estimated pulmonary artery systolic pressure is 38 mmHg, consistent with there is borderline pulmonary hypertension.    The patient lives at home, however does have a home health aide       Patient seen in same day holding area; Reports no changes to PMHx or medications since last seen by our team. Denies acute or current SOB, chest pain, palpitation, N/V/D, fever/chills, recent illness, or any other concerning symptoms.     Admit under Dr. Archibald via same day surgery. Consent signed, placed on chart.  Risks/benefits reviewed, patient understands and agrees. T&S ordered and blood products placed on hold for OR.  To 9 ICU post-op.

## 2023-01-26 NOTE — H&P ADULT - NSICDXPASTMEDICALHX_GEN_ALL_CORE_FT
PAST MEDICAL HISTORY:  Atrial fibrillation and flutter     Chronic diastolic congestive heart failure     HLD (hyperlipidemia)     HTN (hypertension)     MR (mitral regurgitation)     Parkinson disease

## 2023-01-26 NOTE — H&P ADULT - NSHPPHYSICALEXAM_GEN_ALL_CORE
Physical Exam  CONSTITUTIONAL:      Sitting comfortably in bed, NAD  NEURO:  AAOx3, no neuro deficits, CN grossly intact                   EYES:    WNL  ENMT:           WNL  CV:    S1S2, irregular   RESPIRATORY:   CTA b/l, no w/r/r  GI: +BS, soft, nd/nd  : No garduno, deferred  MUSKULOSKELETAL:   WWP, no edema, no calf tenderness, palpable peripheral pulses b/l   SKIN / BREAST:        WNL

## 2023-01-26 NOTE — H&P ADULT - HISTORY OF PRESENT ILLNESS
86 y/o M with pmhx of afib/flutter (on eliquis), Parkinson's disease, poor functional status (uses walker for balance), HTN, HLD, chronic diastolic heart failure, severe MR.    Pt endorses NYHA III symptoms  Worsening TEE over past few months    TTE 11/2022-  -LVEF 55%  -RV size mildly dilated, RV function mildly reduced  -moderate AI  -severe MR with posterior MV prolapse  -Moderate TR, PASP 52mmg    MASOUD on 12/22/2022- 1. Left ventricular systolic function is normal without regional wall motion abnormalities.  2. Severely dilated left atrium.3. No evidence of left atrial or left atrial appendage thrombus. The left atrialappendage emptying velocity is low at 22 cm/s.4. Agitated saline injection was negative for intracardiac shunt.5. 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 Prolapse of the middle scallop (P2) of the posterior mitral leaflet.7. Severe mitral valve regurgitation at a blood pressure of 160/100 mmHg.8. 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 Eccentric anteriorly directed wall hugging mitral jet. EROA 48 cm2.  ml.10. Aortic root at sinuses of Valsalva is mildly dilated.11. Moderate aortic regurgitation.12. Mild tricuspid regurgitation.13. Estimated pulmonary artery systolic pressure is 38 mmHg, consistent with there is borderline pulmonary hypertension.    The patient lives at home, however does have a home health aide       Patient seen in same day holding area; Reports no changes to PMHx or medications since last seen by our team. Denies acute or current SOB, chest pain, palpitation, N/V/D, fever/chills, recent illness, or any other concerning symptoms.

## 2023-01-27 ENCOUNTER — TRANSCRIPTION ENCOUNTER (OUTPATIENT)
Age: 88
End: 2023-01-27

## 2023-01-27 LAB
ANION GAP SERPL CALC-SCNC: 9 MMOL/L — SIGNIFICANT CHANGE UP (ref 5–17)
APTT BLD: 41.7 SEC — HIGH (ref 27.5–35.5)
APTT BLD: >200 SEC — CRITICAL HIGH (ref 27.5–35.5)
BASE EXCESS BLDA CALC-SCNC: -1.3 MMOL/L — SIGNIFICANT CHANGE UP (ref -2–3)
BUN SERPL-MCNC: 22 MG/DL — SIGNIFICANT CHANGE UP (ref 7–23)
CALCIUM SERPL-MCNC: 9.7 MG/DL — SIGNIFICANT CHANGE UP (ref 8.4–10.5)
CHLORIDE SERPL-SCNC: 101 MMOL/L — SIGNIFICANT CHANGE UP (ref 96–108)
CO2 BLDA-SCNC: 24 MMOL/L — SIGNIFICANT CHANGE UP (ref 19–24)
CO2 SERPL-SCNC: 24 MMOL/L — SIGNIFICANT CHANGE UP (ref 22–31)
CREAT SERPL-MCNC: 0.66 MG/DL — SIGNIFICANT CHANGE UP (ref 0.5–1.3)
EGFR: 92 ML/MIN/1.73M2 — SIGNIFICANT CHANGE UP
GLUCOSE SERPL-MCNC: 115 MG/DL — HIGH (ref 70–99)
HCO3 BLDA-SCNC: 23 MMOL/L — SIGNIFICANT CHANGE UP (ref 21–28)
HCT VFR BLD CALC: 35.6 % — LOW (ref 39–50)
HGB BLD-MCNC: 12.1 G/DL — LOW (ref 13–17)
INR BLD: 1.21 — HIGH (ref 0.88–1.16)
INR BLD: 1.34 — HIGH (ref 0.88–1.16)
MAGNESIUM SERPL-MCNC: 1.7 MG/DL — SIGNIFICANT CHANGE UP (ref 1.6–2.6)
MCHC RBC-ENTMCNC: 32.1 PG — SIGNIFICANT CHANGE UP (ref 27–34)
MCHC RBC-ENTMCNC: 34 GM/DL — SIGNIFICANT CHANGE UP (ref 32–36)
MCV RBC AUTO: 94.4 FL — SIGNIFICANT CHANGE UP (ref 80–100)
NRBC # BLD: 0 /100 WBCS — SIGNIFICANT CHANGE UP (ref 0–0)
PCO2 BLDA: 35 MMHG — SIGNIFICANT CHANGE UP (ref 35–48)
PH BLDA: 7.42 — SIGNIFICANT CHANGE UP (ref 7.35–7.45)
PLATELET # BLD AUTO: 209 K/UL — SIGNIFICANT CHANGE UP (ref 150–400)
PO2 BLDA: 115 MMHG — HIGH (ref 83–108)
POTASSIUM SERPL-MCNC: 3.9 MMOL/L — SIGNIFICANT CHANGE UP (ref 3.5–5.3)
POTASSIUM SERPL-SCNC: 3.9 MMOL/L — SIGNIFICANT CHANGE UP (ref 3.5–5.3)
PROTHROM AB SERPL-ACNC: 14.4 SEC — HIGH (ref 10.5–13.4)
PROTHROM AB SERPL-ACNC: 16 SEC — HIGH (ref 10.5–13.4)
RBC # BLD: 3.77 M/UL — LOW (ref 4.2–5.8)
RBC # FLD: 13.7 % — SIGNIFICANT CHANGE UP (ref 10.3–14.5)
SAO2 % BLDA: 99.3 % — HIGH (ref 94–98)
SODIUM SERPL-SCNC: 134 MMOL/L — LOW (ref 135–145)
WBC # BLD: 8.17 K/UL — SIGNIFICANT CHANGE UP (ref 3.8–10.5)
WBC # FLD AUTO: 8.17 K/UL — SIGNIFICANT CHANGE UP (ref 3.8–10.5)

## 2023-01-27 PROCEDURE — 71045 X-RAY EXAM CHEST 1 VIEW: CPT | Mod: 26

## 2023-01-27 PROCEDURE — 93010 ELECTROCARDIOGRAM REPORT: CPT

## 2023-01-27 PROCEDURE — 93355 ECHO TRANSESOPHAGEAL (TEE): CPT | Mod: 26

## 2023-01-27 PROCEDURE — 33418 REPAIR TCAT MITRAL VALVE: CPT | Mod: Q0

## 2023-01-27 DEVICE — INTRO MICROPUNC 4FRX10CM SS: Type: IMPLANTABLE DEVICE | Status: FUNCTIONAL

## 2023-01-27 DEVICE — KIT VERSACROSS PIGTAIL .035IN 45 DEG D1 230CM: Type: IMPLANTABLE DEVICE | Status: FUNCTIONAL

## 2023-01-27 DEVICE — ANGIOSEAL VASC CLOS VIP 6FR: Type: IMPLANTABLE DEVICE | Status: FUNCTIONAL

## 2023-01-27 DEVICE — CATH  INFINITI 5FR MULTIPACK: Type: IMPLANTABLE DEVICE | Status: FUNCTIONAL

## 2023-01-27 DEVICE — MITRACLIP G4 DELIVERY SYSTEM XTW: Type: IMPLANTABLE DEVICE | Status: FUNCTIONAL

## 2023-01-27 DEVICE — SHEATH INTRODUCER TERUMO PINNACLE CORONARY 5FR X 10CM X 0.038" MINI WIRE: Type: IMPLANTABLE DEVICE | Status: FUNCTIONAL

## 2023-01-27 DEVICE — GWIRE GUID  0.035INX150CM: Type: IMPLANTABLE DEVICE | Status: FUNCTIONAL

## 2023-01-27 DEVICE — SHEATH INTRODUCER TERUMO PINNACLE CORONARY 8FR X 10CM X 0.038" MINI WIRE: Type: IMPLANTABLE DEVICE | Status: FUNCTIONAL

## 2023-01-27 DEVICE — CATH GUIDE MITRACLIP G4 STEERABLE: Type: IMPLANTABLE DEVICE | Status: FUNCTIONAL

## 2023-01-27 RX ORDER — ASPIRIN/CALCIUM CARB/MAGNESIUM 324 MG
162 TABLET ORAL ONCE
Refills: 0 | Status: COMPLETED | OUTPATIENT
Start: 2023-01-27 | End: 2023-01-27

## 2023-01-27 RX ORDER — AMANTADINE HCL 100 MG
1 CAPSULE ORAL
Qty: 0 | Refills: 0 | DISCHARGE

## 2023-01-27 RX ORDER — AMANTADINE HCL 100 MG
100 CAPSULE ORAL DAILY
Refills: 0 | Status: DISCONTINUED | OUTPATIENT
Start: 2023-01-27 | End: 2023-01-28

## 2023-01-27 RX ORDER — MAGNESIUM OXIDE 400 MG ORAL TABLET 241.3 MG
800 TABLET ORAL ONCE
Refills: 0 | Status: COMPLETED | OUTPATIENT
Start: 2023-01-27 | End: 2023-01-27

## 2023-01-27 RX ORDER — ACETAMINOPHEN 500 MG
1000 TABLET ORAL ONCE
Refills: 0 | Status: COMPLETED | OUTPATIENT
Start: 2023-01-27 | End: 2023-01-27

## 2023-01-27 RX ORDER — CARBIDOPA AND LEVODOPA 25; 100 MG/1; MG/1
4 TABLET ORAL THREE TIMES A DAY
Refills: 0 | Status: DISCONTINUED | OUTPATIENT
Start: 2023-01-27 | End: 2023-01-28

## 2023-01-27 RX ORDER — APIXABAN 2.5 MG/1
5 TABLET, FILM COATED ORAL EVERY 12 HOURS
Refills: 0 | Status: DISCONTINUED | OUTPATIENT
Start: 2023-01-27 | End: 2023-01-28

## 2023-01-27 RX ORDER — CARBIDOPA AND LEVODOPA 25; 100 MG/1; MG/1
4 TABLET ORAL
Qty: 0 | Refills: 0 | DISCHARGE

## 2023-01-27 RX ORDER — PANTOPRAZOLE SODIUM 20 MG/1
40 TABLET, DELAYED RELEASE ORAL
Refills: 0 | Status: DISCONTINUED | OUTPATIENT
Start: 2023-01-27 | End: 2023-01-28

## 2023-01-27 RX ORDER — POTASSIUM CHLORIDE 20 MEQ
10 PACKET (EA) ORAL ONCE
Refills: 0 | Status: COMPLETED | OUTPATIENT
Start: 2023-01-27 | End: 2023-01-27

## 2023-01-27 RX ORDER — HEPARIN SODIUM 5000 [USP'U]/ML
5000 INJECTION INTRAVENOUS; SUBCUTANEOUS EVERY 8 HOURS
Refills: 0 | Status: DISCONTINUED | OUTPATIENT
Start: 2023-01-27 | End: 2023-01-27

## 2023-01-27 RX ORDER — POLYETHYLENE GLYCOL 3350 17 G/17G
17 POWDER, FOR SOLUTION ORAL DAILY
Refills: 0 | Status: DISCONTINUED | OUTPATIENT
Start: 2023-01-27 | End: 2023-01-28

## 2023-01-27 RX ORDER — TAMSULOSIN HYDROCHLORIDE 0.4 MG/1
0.4 CAPSULE ORAL AT BEDTIME
Refills: 0 | Status: DISCONTINUED | OUTPATIENT
Start: 2023-01-27 | End: 2023-01-28

## 2023-01-27 RX ORDER — SODIUM CHLORIDE 9 MG/ML
1000 INJECTION INTRAMUSCULAR; INTRAVENOUS; SUBCUTANEOUS
Refills: 0 | Status: DISCONTINUED | OUTPATIENT
Start: 2023-01-27 | End: 2023-01-27

## 2023-01-27 RX ORDER — TRAZODONE HCL 50 MG
200 TABLET ORAL AT BEDTIME
Refills: 0 | Status: DISCONTINUED | OUTPATIENT
Start: 2023-01-27 | End: 2023-01-28

## 2023-01-27 RX ORDER — CARBIDOPA AND LEVODOPA 25; 100 MG/1; MG/1
1 TABLET ORAL
Qty: 0 | Refills: 0 | DISCHARGE

## 2023-01-27 RX ORDER — AMLODIPINE BESYLATE 2.5 MG/1
5 TABLET ORAL DAILY
Refills: 0 | Status: DISCONTINUED | OUTPATIENT
Start: 2023-01-27 | End: 2023-01-28

## 2023-01-27 RX ORDER — CARBIDOPA AND LEVODOPA 25; 100 MG/1; MG/1
1 TABLET ORAL ONCE
Refills: 0 | Status: COMPLETED | OUTPATIENT
Start: 2023-01-27 | End: 2023-01-27

## 2023-01-27 RX ADMIN — CARBIDOPA AND LEVODOPA 4 TABLET(S): 25; 100 TABLET ORAL at 05:19

## 2023-01-27 RX ADMIN — Medication 1000 MILLIGRAM(S): at 06:23

## 2023-01-27 RX ADMIN — Medication 200 MILLIGRAM(S): at 21:29

## 2023-01-27 RX ADMIN — CARBIDOPA AND LEVODOPA 4 TABLET(S): 25; 100 TABLET ORAL at 15:40

## 2023-01-27 RX ADMIN — Medication 162 MILLIGRAM(S): at 11:28

## 2023-01-27 RX ADMIN — CARBIDOPA AND LEVODOPA 1 TABLET(S): 25; 100 TABLET ORAL at 11:50

## 2023-01-27 RX ADMIN — SODIUM CHLORIDE 10 MILLILITER(S): 9 INJECTION INTRAMUSCULAR; INTRAVENOUS; SUBCUTANEOUS at 11:27

## 2023-01-27 RX ADMIN — Medication 400 MILLIGRAM(S): at 05:36

## 2023-01-27 RX ADMIN — APIXABAN 5 MILLIGRAM(S): 2.5 TABLET, FILM COATED ORAL at 19:47

## 2023-01-27 RX ADMIN — MAGNESIUM OXIDE 400 MG ORAL TABLET 800 MILLIGRAM(S): 241.3 TABLET ORAL at 11:35

## 2023-01-27 RX ADMIN — PANTOPRAZOLE SODIUM 40 MILLIGRAM(S): 20 TABLET, DELAYED RELEASE ORAL at 11:35

## 2023-01-27 RX ADMIN — TAMSULOSIN HYDROCHLORIDE 0.4 MILLIGRAM(S): 0.4 CAPSULE ORAL at 16:03

## 2023-01-27 RX ADMIN — AMLODIPINE BESYLATE 5 MILLIGRAM(S): 2.5 TABLET ORAL at 15:39

## 2023-01-27 RX ADMIN — Medication 100 MILLIGRAM(S): at 15:40

## 2023-01-27 RX ADMIN — HEPARIN SODIUM 5000 UNIT(S): 5000 INJECTION INTRAVENOUS; SUBCUTANEOUS at 05:18

## 2023-01-27 RX ADMIN — Medication 10 MILLIEQUIVALENT(S): at 11:35

## 2023-01-27 RX ADMIN — CARBIDOPA AND LEVODOPA 4 TABLET(S): 25; 100 TABLET ORAL at 21:31

## 2023-01-27 NOTE — DISCHARGE NOTE PROVIDER - NSDCFUADDAPPT_GEN_ALL_CORE_FT
The office should call you Monday with your follow-up appointments. If you do not receive a call by Tuesday, please call the office (phone numbers provided above)    Follow-up with Dr. Archibald in 1 week    Follow-up with Dr. Cruz in 1-2 weeks

## 2023-01-27 NOTE — PRE-OP CHECKLIST - LOOSE TEETH
Goal Outcome Evaluation:     Patient Agreement with Plan of Care: agrees     Progress: improving  Outcome Summary: pt has rested well this shift. Slept approx 6 hours at this time.  
no
no

## 2023-01-27 NOTE — DISCHARGE NOTE PROVIDER - CARE PROVIDERS DIRECT ADDRESSES
,DirectAddress_Unknown,[email or web address]

## 2023-01-27 NOTE — DISCHARGE NOTE PROVIDER - CARE PROVIDER_API CALL
Sajan Archibald)  Cardiovascular Disease; Internal Medicine; Interventional Cardiology  [address] [place] [address]  Phone: [phone]  Fax: [phone]  Follow Up Time:     Vincent Cruz)  Internal Medicine  [facility] [place] [address]  Phone: [phone]  Fax: [phone]  Follow Up Time:

## 2023-01-27 NOTE — DISCHARGE NOTE PROVIDER - NSDCCPTREATMENT_GEN_ALL_CORE_FT
PRINCIPAL PROCEDURE  Procedure: Insertion, leaflet clip, mitral valve  Findings and Treatment:       SECONDARY PROCEDURE  Procedure: Left heart catheterization  Findings and Treatment:

## 2023-01-27 NOTE — PRE-OP CHECKLIST - PATIENT'S PERSONAL PROPERTY GIVEN TO
Quality 154 Part B: Falls: Risk Screening (Should Be Reported With Measure 155.): No documentation of falls status
Quality 130: Documentation Of Current Medications In The Medical Record: Current Medications Documented
Quality 431: Preventive Care And Screening: Unhealthy Alcohol Use - Screening: Patient screened for unhealthy alcohol use using a single question and scores 2 or greater episodes per year and brief intervention occurred
Quality 110: Preventive Care And Screening: Influenza Immunization: Influenza Immunization previously received during influenza season
Detail Level: Detailed
Quality 154 Part A: Falls: Risk Assessment (Should Be Reported With Measure 155.): Falls risk assessment not completed, reason not otherwise specified
security/safe
Quality 226: Preventive Care And Screening: Tobacco Use: Screening And Cessation Intervention: Patient screened for tobacco and never smoked
Quality 134: Screening For Clinical Depression And Follow-Up Plan: Depression Screening not documented, reason not given
Quality 155: Falls Plan Of Care: Plan of Care not Documented, Reason not Otherwise Specified
family member/security/safe

## 2023-01-27 NOTE — BRIEF OPERATIVE NOTE - NSICDXBRIEFPROCEDURE_GEN_ALL_CORE_FT
PROCEDURES:  Insertion, leaflet clip, mitral valve 27-Jan-2023 10:15:43  Haylie Lunsford  Left heart catheterization 27-Jan-2023 10:15:58  Haylie Lunsford

## 2023-01-27 NOTE — PROGRESS NOTE ADULT - SUBJECTIVE AND OBJECTIVE BOX
Operation / Date: [facility], mitraclip on 1/27/23    SUBJECTIVE ASSESSMENT: Patient seen and examined at bedside. Patient states that he feels okay, just tired. Denies chills, chest pain, SOB, palpitations, N/V.     Vital Signs Last 24 Hrs  T(C): 36.2 (27 Jan 2023 07:14), Max: 36.9 (26 Jan 2023 22:10)  T(F): 97.1 (27 Jan 2023 06:26), Max: 98.5 (26 Jan 2023 22:10)  HR: 71 (27 Jan 2023 11:30) (66 - 84)  BP: 160/82 (27 Jan 2023 10:15) (146/99 - 181/103)  BP(mean): 113 (27 Jan 2023 10:15) (113 - 131)  RR: 18 (27 Jan 2023 11:00) (16 - 18)  SpO2: 97% (27 Jan 2023 11:30) (97% - 100%)    Parameters below as of 27 Jan 2023 11:00  Patient On (Oxygen Delivery Method): nasal cannula w/ humidification  O2 Flow (L/min): 4    I&O's Detail    26 Jan 2023 07:01  -  27 Jan 2023 07:00  --------------------------------------------------------  IN:  Total IN: 0 mL    OUT:    Voided (mL): 1700 mL  Total OUT: 1700 mL    Total NET: -1700 mL    CHEST TUBE:  None  ANA MARIA DRAIN:  None  EPICARDIAL WIRES: None  TIE DOWNS: yes x 2  ROBERTS: None    PHYSICAL EXAM:  GENERAL: NAD, lying supine in bed   HEAD:  Atraumatic, Normocephalic  EYES: EOMI, PERRLA, conjunctiva and sclera clear  ENT: Moist mucous membranes  NECK: Supple, No JVD  CHEST/LUNG: CTAB; No rales, rhonchi, wheezing, or rubs. Unlabored respirations  HEART: Regular rate and rhythm; No murmurs, rubs, or gallops  ABDOMEN: Bowel sounds present; Soft, Nontender, Nondistended. No hepatomegally  EXTREMITIES:  2+ Peripheral Pulses, brisk capillary refill. No clubbing, cyanosis, or edema  GROIN: bilateral groin sutures in place  NERVOUS SYSTEM:  Alert & Oriented X3, speech clear. No deficits     LABS:                        12.1   8.17  )-----------( 209      ( 27 Jan 2023 10:32 )             35.6     PT/INR - ( 27 Jan 2023 10:32 )   PT: 16.0 sec;   INR: 1.34       PTT - ( 27 Jan 2023 10:32 )  PTT:>200.0 sec    01-27    134<L>  |  101  |  22  ----------------------------<  115<H>  3.9   |  24  |  0.66    Ca    9.7      27 Jan 2023 10:32  Mg     1.7     01-27    TPro  7.8  /  Alb  4.1  /  TBili  0.7  /  DBili  x   /  AST  21  /  ALT  11  /  AlkPhos  122<H>  01-26    MEDICATIONS  (STANDING):  carbidopa/levodopa  25/100 1 Tablet(s) Oral once  heparin   Injectable 5000 Unit(s) SubCutaneous every 8 hours  pantoprazole    Tablet 40 milliGRAM(s) Oral before breakfast  sodium chloride 0.9%. 1000 milliLiter(s) (10 mL/Hr) IV Continuous <Continuous>    MEDICATIONS  (PRN):    RADIOLOGY & ADDITIONAL TESTS:  < from: Xray Chest 1 View- PORTABLE-Urgent (01.27.23 @ 10:36) >  Findings/  impression: Cardiomegaly/cardiac magnification, thoracic aortic   calcification. Charmaine clip. Left basilar focal atelectasis. Stable bony   structures.        
Patient discussed on morning rounds with Dr. Archibald     Operation / Date: Pre-op Mitral-Clip/LHC tomorrow     SUBJECTIVE ASSESSMENT:  87y Male assessed at bedside. Denies cp/sob/n/v/fever/chills.         Vital Signs Last 24 Hrs  T(C): 36.3 (26 Jan 2023 17:29), Max: 36.4 (26 Jan 2023 14:00)  T(F): 97.3 (26 Jan 2023 17:29), Max: 97.5 (26 Jan 2023 14:00)  HR: 78 (26 Jan 2023 14:00) (78 - 78)  BP: 146/99 (26 Jan 2023 14:00) (146/99 - 146/99)  BP(mean): 114 (26 Jan 2023 14:00) (114 - 114)  RR: 18 (26 Jan 2023 14:00) (18 - 18)  SpO2: 98% (26 Jan 2023 14:00) (98% - 98%)    Parameters below as of 26 Jan 2023 14:00  Patient On (Oxygen Delivery Method): room air      I&O's Detail      CHEST TUBE:  No.   ANA MARIA DRAIN: No.  EPICARDIAL WIRES: No.  TIE DOWNS: No.  ROBERTS: No.    PHYSICAL EXAM:    Appearance: No acute distress.  Neurologic: AAOx3, no AMS or focal deficits.  Responds appropriately to verbal and physical stimuli; exhibits purposeful movement in all extremities.  HEENT:   MMM, PERRLA, EOMI	b/l  Neck: Supple  Cardiovascular: RRR, S1 S2. No m/r/g.  Respiratory: No acute respiratory distress. CTA b/l, no w/r/r.   Gastrointestinal:  Soft, non-tender, non-distended, + BS.	  Skin: No rashes. No ecchymoses. No cyanosis.  Extremities: +1 bilateral LE edema. Exhibits normal range of motion, no clubbing, cyanosis.  Vascular: Peripheral pulses palpable 2+ bilaterally.     LABS:                        13.6   7.44  )-----------( 229      ( 26 Jan 2023 08:01 )             40.2       COUMADIN:  No    PT/INR - ( 26 Jan 2023 08:01 )   PT: 14.2 sec;   INR: 1.19          PTT - ( 26 Jan 2023 08:01 )  PTT:38.2 sec    01-26    136  |  100  |  18  ----------------------------<  103<H>  4.4   |  26  |  0.66    Ca    9.7      26 Jan 2023 09:10    TPro  7.8  /  Alb  4.1  /  TBili  0.7  /  DBili  x   /  AST  21  /  ALT  11  /  AlkPhos  122<H>  01-26          MEDICATIONS  (STANDING):  carbidopa/levodopa  25/100 4 Tablet(s) Oral three times a day  heparin   Injectable 5000 Unit(s) SubCutaneous every 8 hours  pantoprazole    Tablet 40 milliGRAM(s) Oral daily  polyethylene glycol 3350 17 Gram(s) Oral daily  senna 2 Tablet(s) Oral at bedtime  sodium chloride 0.9%. 1000 milliLiter(s) (10 mL/Hr) IV Continuous <Continuous>  traZODone 200 milliGRAM(s) Oral at bedtime    MEDICATIONS  (PRN):

## 2023-01-27 NOTE — PRE-ANESTHESIA EVALUATION ADULT - HEART RATE (BEATS/MIN)
"Subjective:      Greta Tran is a 68 y.o. female who presents with Shoulder Pain (Right side x 3 weeks)            3 weeks right shoulder and upper back pain. No trauma. Possibly triggered by working in yard.  \"feels like something pulling\". Pain severity 8/10. Radiates to upper arm occasionally. No change with head position. OTC advil and tylenol with some improvement. No other aggravating or alleviating factors.          Review of Systems   Constitutional: Negative for fever, malaise/fatigue and weight loss.   Genitourinary: Negative for flank pain and hematuria.   Skin: Negative for itching and rash.   Neurological: Negative for sensory change and focal weakness.        No myelopathy     .  Medications, Allergies, and current problem list reviewed today in Epic       Objective:     /88   Pulse 89   Temp 36.8 °C (98.2 °F)   Resp 15   Ht 1.6 m (5' 3\")   Wt 81.6 kg (180 lb)   SpO2 95%   BMI 31.89 kg/m²      Physical Exam   Constitutional: She appears well-developed and well-nourished. No distress.   HENT:   Head: Normocephalic and atraumatic.   Musculoskeletal:        Back:                Assessment/Plan:     1. Acute pain of right shoulder  DX-SHOULDER 2+ RIGHT   2. Upper back pain on right side     3. Calcific tendinitis  REFERRAL TO SPORTS MEDICINE     Differential diagnosis, natural history, supportive care, and indications for immediate follow-up discussed at length.     Ice, NSAID, encourage range of motion and movement.    " 84

## 2023-01-27 NOTE — DISCHARGE NOTE PROVIDER - HOSPITAL COURSE
Patient discussed on morning rounds with  _____    Operation Date: LHC, Mitraclip on 1/27/23    Primary Surgeon/Attending MD: Dr. Archibald    Referring Physician: Dr. Vincent Cruz  _ _ _ _ _ _ _ _ _ _ _ _  HOSPITAL COURSE:  86 YO Male w/ PMHx of afib/flutter (on Eliquis), Parkinson's disease, poor functional status (uses walker for balance), HTN, HLD, chronic diastolic heart failure, severe MR who underwent LHC and mitral clip on 1/27/23 with Dr. Archibald. Patient recovered on 9La as mini-ICU. POD1 patient cleared for discharge home per ______ on 1/28/23.   _ _ _ _ _ _ _ _ _ _ _ _  DISCHARGE PHYSICAL EXAM:  General:  Neuro:  Cardio:  Pulm:  GI/:  Vascular:  Extremities:  Incisions:  _ _ _ _ _ _ _ _ _ _ _ _  REMOVAL CHECKLIST:          [ ] Stitches/tie downs,   If no, why? ______  _ _ _ _ _ _ _ _ _ _ _ _   MEDICATION DISCHARGE CHECKLIST      Anticoagulation        [ ] NOAC, [ ] Reason _______________________________              Cost/Insurance barriers addressed: YES/NO         [ ] Coumadin, [ ] Reason _______________________________              Dose:___________              INR Goal: ___________              Follow up established: YES/NO  _ _ _ _ _ _ _ _ _ _ _ _  RELEVANT LABS/IMAGING:    _ _ _ _ _ _ _ _ _ _ _ _  CLINICAL FOLLOW UP NEEDS:       [ ] Home equipment           Type: (i.e. wound vac, pneumostat, prevena, wet/dry dressings, picc/midlines, MCOT, garduno etc)           Specific needs:           Outpatient team aware: YES/NO  _ _ _ _ _ _ _ _ _ _ _ _  Over 35 minutes was spent with the patient reviewing the discharge material including medications, follow up appointments, recovery, concerning symptoms, and how to contact their health care providers if they have questions   Patient discussed on morning rounds with  _____    Operation Date: LHC, Mitraclip on 1/27/23    Primary Surgeon/Attending MD: Dr. Archibald    Referring Physician: Dr. Vincent Cruz  _ _ _ _ _ _ _ _ _ _ _ _  HOSPITAL COURSE:  88 YO Male w/ PMHx of afib/flutter (on Eliquis), Parkinson's disease, poor functional status (uses walker for balance), HTN, HLD, chronic diastolic heart failure, severe MR who underwent LHC and mitral clip on 1/27/23 with Dr. Archibald. Patient recovered on 9La as mini-ICU. Coags normal, groin suture removed and Eliquis started in PM. POD1 patient cleared for discharge home per ______ on 1/28/23.   _ _ _ _ _ _ _ _ _ _ _ _  DISCHARGE PHYSICAL EXAM:  General:  Neuro:  Cardio:  Pulm:  GI/:  Vascular:  Extremities:  Incisions:  _ _ _ _ _ _ _ _ _ _ _ _  REMOVAL CHECKLIST:          [ ] Stitches/tie downs,   If no, why? ______  _ _ _ _ _ _ _ _ _ _ _ _   MEDICATION DISCHARGE CHECKLIST      Anticoagulation        [ ] NOAC, [ ] Reason _______________________________              Cost/Insurance barriers addressed: YES/NO         [ ] Coumadin, [ ] Reason _______________________________              Dose:___________              INR Goal: ___________              Follow up established: YES/NO  _ _ _ _ _ _ _ _ _ _ _ _  RELEVANT LABS/IMAGING:    _ _ _ _ _ _ _ _ _ _ _ _  CLINICAL FOLLOW UP NEEDS:       [ ] Home equipment           Type: (i.e. wound vac, pneumostat, prevena, wet/dry dressings, picc/midlines, MCOT, garduno etc)           Specific needs:           Outpatient team aware: YES/NO  _ _ _ _ _ _ _ _ _ _ _ _  Over 35 minutes was spent with the patient reviewing the discharge material including medications, follow up appointments, recovery, concerning symptoms, and how to contact their health care providers if they have questions   Patient discussed on morning rounds with . _____    Operation Date: LHC, Mitraclip on 1/27/23    Primary Surgeon/Attending MD: Dr. Archibald    Referring Physician: Dr. Vincent Cruz  _ _ _ _ _ _ _ _ _ _ _ _  HOSPITAL COURSE:  86 YO Male w/ PMHx of afib/flutter (on Eliquis), Parkinson's disease, poor functional status (uses walker for balance), HTN, HLD, chronic diastolic heart failure, severe MR who underwent LHC and mitral clip on 1/27/23 with Dr. Archibald. Patient recovered on 9La as mini-ICU. Coags normal, groin suture removed and Eliquis started in PM. Post-operative Echo demonstrated mildly reduced EF and trace mitral regurgitation. POD1 patient cleared for discharge home per ______ on 1/28/23.   _ _ _ _ _ _ _ _ _ _ _ _  DISCHARGE PHYSICAL EXAM:  GEN: NAD, looks comfortable  Psych: Mood appropriate  Neuro: A&Ox3.  No focal deficits.  Moving all extremities.   HEENT: No obvious abnormalities  CV: S1S2, regular, no murmurs appreciated.  No carotid bruits.  No JVD  Lungs: Clear B/L.  No wheezing, rales or rhonchi  ABD: Soft, non-tender, non-distended.  +Bowel sounds  EXT: Warm and well perfused.  No peripheral edema noted. Bilateral groin sites c/d/i with no ecchymosis, tenderness or induration.   Musculoskeletal: Moving all extremities with normal ROM, no joint swelling.     _ _ _ _   _ _ _ _ _ _ _ _  REMOVAL CHECKLIST:          [ ] Stitches/tie downs,   If no, why? ______  _ _ _ _ _ _ _ _ _ _ _ _   MEDICATION DISCHARGE CHECKLIST      Anticoagulation        [ ] NOAC, [ ] Reason _______________________________              Cost/Insurance barriers addressed: YES/NO         [ ] Coumadin, [ ] Reason _______________________________              Dose:___________              INR Goal: ___________              Follow up established: YES/NO  _ _ _ _ _ _ _ _ _ _ _ _  RELEVANT LABS/IMAGING:    _ _ _ _ _ _ _ _ _ _ _ _  CLINICAL FOLLOW UP NEEDS:       [ ] Home equipment           Type: (i.e. wound vac, pneumostat, prevena, wet/dry dressings, picc/midlines, MCOT, garduno etc)           Specific needs:           Outpatient team aware: YES/NO  _ _ _ _ _ _ _ _ _ _ _ _  Over 35 minutes was spent with the patient reviewing the discharge material including medications, follow up appointments, recovery, concerning symptoms, and how to contact their health care providers if they have questions   Patient discussed on morning rounds with . _____    Operation Date: LHC, Mitraclip on 1/27/23    Primary Surgeon/Attending MD: Dr. Archibald    Referring Physician: Dr. Vincent Cruz  _ _ _ _ _ _ _ _ _ _ _ _  HOSPITAL COURSE:  86 YO Male w/ PMHx of afib/flutter (on Eliquis), Parkinson's disease, poor functional status (uses walker for balance), HTN, HLD, chronic diastolic heart failure, severe MR who underwent LHC and mitral clip on 1/27/23 with Dr. Archibald. Patient recovered on 9La as mini-ICU. Coags normal, groin suture removed and Eliquis started in PM. Post-operative Echo demonstrated mildly reduced EF and trace mitral regurgitation. POD1 patient found to have blue and cold left thumb and index finger. Improved with warm compress. All pulses including palmar arch were dopplerable. Left upper extremity arterial duplex showed patent vessel.  POD#1 patient cleared for discharge home per Dr. Archibald on 1/28/23.   _ _ _ _ _ _ _ _ _ _ _ _  DISCHARGE PHYSICAL EXAM:  GEN: NAD, looks comfortable  Psych: Mood appropriate  Neuro: A&Ox3.  No focal deficits.  Moving all extremities.   HEENT: No obvious abnormalities  CV: S1S2, regular, no murmurs appreciated.  No carotid bruits.  No JVD  Lungs: Clear B/L.  No wheezing, rales or rhonchi  ABD: Soft, non-tender, non-distended.  +Bowel sounds  EXT: Warm and well perfused.  No peripheral edema noted. Bilateral groin sites c/d/i with no ecchymosis, tenderness or induration.   Musculoskeletal: Moving all extremities with normal ROM, no joint swelling.     _ _ _ _   _ _ _ _ _ _ _ _  REMOVAL CHECKLIST:          [ x] Stitches/tie downs,   If no, why? ______    _ _ _ _ _ _ _ _ _ _ _ _  Over 35 minutes was spent with the patient reviewing the discharge material including medications, follow up appointments, recovery, concerning symptoms, and how to contact their health care providers if they have questions   Patient discussed on morning rounds with . _____    Operation Date: LHC, Mitraclip on 1/27/23    Primary Surgeon/Attending MD: Dr. Archibald    Referring Physician: Dr. Vincent Cruz  _ _ _ _ _ _ _ _ _ _ _ _  HOSPITAL COURSE:  88 YO Male w/ PMHx of afib/flutter (on Eliquis), Parkinson's disease, poor functional status (uses walker for balance), HTN, HLD, chronic diastolic heart failure, severe MR who underwent LHC and mitral clip on 1/27/23 with Dr. Archibald. Patient recovered on 9La as mini-ICU. Coags normal, groin suture removed and Eliquis started in PM. Post-operative Echo demonstrated mildly reduced EF and trace mitral regurgitation. Overnight patient had multiple post void residuals with > 500cc in the bladder.  A garduno was placed and patient will be discharged home with a garduno to follow up with his outpatient urologist. POD1 patient found to have blue and cold left thumb and index finger. Improved with warm compress. All pulses including palmar arch were dopplerable. Left upper extremity arterial duplex showed patent vessel.  POD#1 patient cleared for discharge home per Dr. Archibald on 1/28/23.   _ _ _ _ _ _ _ _ _ _ _ _  DISCHARGE PHYSICAL EXAM:  GEN: NAD, looks comfortable  Psych: Mood appropriate  Neuro: A&Ox3.  No focal deficits.  Moving all extremities.   HEENT: No obvious abnormalities  CV: S1S2, regular, no murmurs appreciated.  No carotid bruits.  No JVD  Lungs: Clear B/L.  No wheezing, rales or rhonchi  ABD: Soft, non-tender, non-distended.  +Bowel sounds  EXT: Warm and well perfused.  No peripheral edema noted. Bilateral groin sites c/d/i with no ecchymosis, tenderness or induration.   Musculoskeletal: Moving all extremities with normal ROM, no joint swelling.     _ _ _ _   _ _ _ _ _ _ _ _  REMOVAL CHECKLIST:          [ x] Stitches/tie downs,   If no, why? ______    _ _ _ _ _ _ _ _ _ _ _ _  Over 35 minutes was spent with the patient reviewing the discharge material including medications, follow up appointments, recovery, concerning symptoms, and how to contact their health care providers if they have questions

## 2023-01-27 NOTE — DISCHARGE NOTE PROVIDER - NSDCMRMEDTOKEN_GEN_ALL_CORE_FT
amantadine 100 mg oral tablet: 1 tab(s) orally once a day  carbidopa-levodopa 25 mg-100 mg oral tablet: 4 tab(s) orally 3 times a day  Eliquis 5 mg oral tablet: 1 tab(s) orally 2 times a day  omeprazole 20 mg oral delayed release capsule: 1 cap(s) orally once a day  traZODone 100 mg oral tablet: 2 tab(s) orally once (at bedtime)   acetaminophen 325 mg oral tablet: 2 tab(s) orally every 6 hours, As needed, Mild Pain (1 - 3)  amantadine 100 mg oral tablet: 1 tab(s) orally once a day  amLODIPine 5 mg oral tablet: 1 tab(s) orally once a day  carbidopa-levodopa 25 mg-100 mg oral tablet: 4 tab(s) orally 3 times a day  Eliquis 5 mg oral tablet: 1 tab(s) orally 2 times a day  omeprazole 20 mg oral delayed release capsule: 1 cap(s) orally once a day  tamsulosin 0.4 mg oral capsule: 1 cap(s) orally once a day (at bedtime)  traZODone 100 mg oral tablet: 2 tab(s) orally once (at bedtime)   acetaminophen 325 mg oral tablet: 2 tab(s) orally every 6 hours, As needed, Mild Pain (1 - 3)  amantadine 100 mg oral tablet: 1 tab(s) orally once a day  amLODIPine 5 mg oral tablet: 1 tab(s) orally once a day  carbidopa-levodopa 25 mg-100 mg oral tablet: 4 tab(s) orally 3 times a day  Eliquis 5 mg oral tablet: 1 tab(s) orally 2 times a day  omeprazole 20 mg oral delayed release capsule: 1 cap(s) orally once a day  tamsulosin 0.4 mg oral capsule: 1 cap(s) orally once a day (at bedtime)  tamsulosin 0.4 mg oral capsule: 1 cap(s) orally once a day (at bedtime)  traZODone 100 mg oral tablet: 2 tab(s) orally once (at bedtime)

## 2023-01-27 NOTE — PROGRESS NOTE ADULT - ASSESSMENT
88 YO Male w/ PMHx of afib/flutter (on Eliquis), Parkinson's disease, poor functional status (uses walker for balance), HTN, HLD, chronic diastolic heart failure, severe MR who underwent LHC and mitral clip on 1/27/23 with Dr. Archibald. Patient recovered on 9La as mini-ICU.     Plan:    Neurovascular:   -Hx of Parkinson's disease  -Pain well controlled with current regimen. PRN's:     Cardiovascular:   -Severe MR s/p LHC and mitraclip on 1/27/23  -Hx of A-Fib/A-Flutter  -PTT >200  -Home Eliquis held  -Hx of HTN  -Hx of HLD  -Hemodynamically stable.   -Monitor: BP, HR, tele    Respiratory:   -Oxygenating well on room air  -Encourage continued use of IS 10x/hr and frequent ambulation  -CXR:    GI:  -GI PPX:  -PO Diet  -Bowel Regimen:     Renal / :  -Continue to monitor renal function: BUN/Cr  -Monitor I/O's daily     Endocrine:    -No hx of DM or thyroid dx  -A1c:  -TSH:    Hematologic:  -CBC: H/H-  -Coagulation Panel.    ID:  -Temperature:   -CBC: WBC-  -Continue to observe for SIRS/Sepsis Syndrome.    Prophylaxis:  -DVT prophylaxis with 5000 SubQ Heparin q8h.  -Continue with SCD's b/l while patient is at rest     Disposition:  -Discharge home once patient is medically ready   88 YO Male w/ PMHx of afib/flutter (on Eliquis), Parkinson's disease, poor functional status (uses walker for balance), HTN, HLD, chronic diastolic heart failure, severe MR who underwent LHC and mitral clip on 1/27/23 with Dr. Archibald. Patient recovered on 9La as mini-ICU.     Plan:    Neurovascular:   -Hx of Parkinson's disease  -Cont Carbidopa/Levodopa   -Pain well controlled.  -Cont home Trazodone    Cardiovascular:   -Severe MR s/p LHC and mitraclip on 1/27/23  -Re-start Eliquis once PTT normalizes  -F/u TTE  -Hx of A-Fib/A-Flutter  -PTT >200  -Home Eliquis held  -Hx of HTN  -Hx of HLD  -Hemodynamically stable.   -Monitor: BP, HR, tele    Respiratory:   -Oxygenating well on room air  -Encourage continued use of IS 10x/hr and frequent ambulation  -CXR: stable    GI:  -GI PPX: protonix  -PO Diet  -Bowel Regimen: miralax    Renal / :  -Continue to monitor renal function: BUN/Cr: 22/0.66  -Monitor I/O's daily     Endocrine:    -No hx of DM or thyroid dx  -A1c: none  -TSH: none    Hematologic:  -CBC: H/H- 12.1/35.6  -Coagulation Panel.    ID:  -Temperature: Afebrile  -CBC: WBC- 8.17  -Continue to observe for SIRS/Sepsis Syndrome.    Prophylaxis:  -DVT prophylaxis with 5000 SubQ Heparin q8h.  -Continue with SCD's b/l while patient is at rest     Disposition:  -Discharge home once patient is medically ready

## 2023-01-28 ENCOUNTER — TRANSCRIPTION ENCOUNTER (OUTPATIENT)
Age: 88
End: 2023-01-28

## 2023-01-28 VITALS — HEART RATE: 65 BPM

## 2023-01-28 LAB
ANION GAP SERPL CALC-SCNC: 7 MMOL/L — SIGNIFICANT CHANGE UP (ref 5–17)
APTT BLD: 36.1 SEC — HIGH (ref 27.5–35.5)
BUN SERPL-MCNC: 25 MG/DL — HIGH (ref 7–23)
CALCIUM SERPL-MCNC: 9.1 MG/DL — SIGNIFICANT CHANGE UP (ref 8.4–10.5)
CHLORIDE SERPL-SCNC: 101 MMOL/L — SIGNIFICANT CHANGE UP (ref 96–108)
CO2 SERPL-SCNC: 28 MMOL/L — SIGNIFICANT CHANGE UP (ref 22–31)
CREAT SERPL-MCNC: 0.57 MG/DL — SIGNIFICANT CHANGE UP (ref 0.5–1.3)
EGFR: 96 ML/MIN/1.73M2 — SIGNIFICANT CHANGE UP
GLUCOSE SERPL-MCNC: 146 MG/DL — HIGH (ref 70–99)
HCT VFR BLD CALC: 36.4 % — LOW (ref 39–50)
HGB BLD-MCNC: 11.8 G/DL — LOW (ref 13–17)
INR BLD: 1.43 — HIGH (ref 0.88–1.16)
MAGNESIUM SERPL-MCNC: 1.9 MG/DL — SIGNIFICANT CHANGE UP (ref 1.6–2.6)
MCHC RBC-ENTMCNC: 31.4 PG — SIGNIFICANT CHANGE UP (ref 27–34)
MCHC RBC-ENTMCNC: 32.4 GM/DL — SIGNIFICANT CHANGE UP (ref 32–36)
MCV RBC AUTO: 96.8 FL — SIGNIFICANT CHANGE UP (ref 80–100)
NRBC # BLD: 0 /100 WBCS — SIGNIFICANT CHANGE UP (ref 0–0)
PLATELET # BLD AUTO: 210 K/UL — SIGNIFICANT CHANGE UP (ref 150–400)
POTASSIUM SERPL-MCNC: 4 MMOL/L — SIGNIFICANT CHANGE UP (ref 3.5–5.3)
POTASSIUM SERPL-SCNC: 4 MMOL/L — SIGNIFICANT CHANGE UP (ref 3.5–5.3)
PROTHROM AB SERPL-ACNC: 17.1 SEC — HIGH (ref 10.5–13.4)
RBC # BLD: 3.76 M/UL — LOW (ref 4.2–5.8)
RBC # FLD: 14 % — SIGNIFICANT CHANGE UP (ref 10.3–14.5)
SODIUM SERPL-SCNC: 136 MMOL/L — SIGNIFICANT CHANGE UP (ref 135–145)
WBC # BLD: 4.97 K/UL — SIGNIFICANT CHANGE UP (ref 3.8–10.5)
WBC # FLD AUTO: 4.97 K/UL — SIGNIFICANT CHANGE UP (ref 3.8–10.5)

## 2023-01-28 PROCEDURE — 71045 X-RAY EXAM CHEST 1 VIEW: CPT | Mod: 26

## 2023-01-28 PROCEDURE — 71045 X-RAY EXAM CHEST 1 VIEW: CPT

## 2023-01-28 PROCEDURE — C1769: CPT

## 2023-01-28 PROCEDURE — 36415 COLL VENOUS BLD VENIPUNCTURE: CPT

## 2023-01-28 PROCEDURE — 85730 THROMBOPLASTIN TIME PARTIAL: CPT

## 2023-01-28 PROCEDURE — C1760: CPT

## 2023-01-28 PROCEDURE — C1894: CPT

## 2023-01-28 PROCEDURE — 80053 COMPREHEN METABOLIC PANEL: CPT

## 2023-01-28 PROCEDURE — 80048 BASIC METABOLIC PNL TOTAL CA: CPT

## 2023-01-28 PROCEDURE — 93312 ECHO TRANSESOPHAGEAL: CPT

## 2023-01-28 PROCEDURE — 83735 ASSAY OF MAGNESIUM: CPT

## 2023-01-28 PROCEDURE — 85610 PROTHROMBIN TIME: CPT

## 2023-01-28 PROCEDURE — 86923 COMPATIBILITY TEST ELECTRIC: CPT

## 2023-01-28 PROCEDURE — 84132 ASSAY OF SERUM POTASSIUM: CPT

## 2023-01-28 PROCEDURE — 82962 GLUCOSE BLOOD TEST: CPT

## 2023-01-28 PROCEDURE — 93005 ELECTROCARDIOGRAM TRACING: CPT

## 2023-01-28 PROCEDURE — C9399: CPT

## 2023-01-28 PROCEDURE — 82947 ASSAY GLUCOSE BLOOD QUANT: CPT

## 2023-01-28 PROCEDURE — 82803 BLOOD GASES ANY COMBINATION: CPT

## 2023-01-28 PROCEDURE — 86901 BLOOD TYPING SEROLOGIC RH(D): CPT

## 2023-01-28 PROCEDURE — 86850 RBC ANTIBODY SCREEN: CPT

## 2023-01-28 PROCEDURE — 84295 ASSAY OF SERUM SODIUM: CPT

## 2023-01-28 PROCEDURE — 85027 COMPLETE CBC AUTOMATED: CPT

## 2023-01-28 PROCEDURE — 93931 UPPER EXTREMITY STUDY: CPT | Mod: 26,LT

## 2023-01-28 PROCEDURE — C1766: CPT

## 2023-01-28 PROCEDURE — C1887: CPT

## 2023-01-28 PROCEDURE — 93306 TTE W/DOPPLER COMPLETE: CPT

## 2023-01-28 PROCEDURE — 85014 HEMATOCRIT: CPT

## 2023-01-28 PROCEDURE — C1889: CPT

## 2023-01-28 PROCEDURE — 93931 UPPER EXTREMITY STUDY: CPT

## 2023-01-28 PROCEDURE — 86900 BLOOD TYPING SEROLOGIC ABO: CPT

## 2023-01-28 PROCEDURE — 82330 ASSAY OF CALCIUM: CPT

## 2023-01-28 PROCEDURE — 83880 ASSAY OF NATRIURETIC PEPTIDE: CPT

## 2023-01-28 RX ORDER — AMLODIPINE BESYLATE 2.5 MG/1
1 TABLET ORAL
Qty: 30 | Refills: 0
Start: 2023-01-28 | End: 2023-02-26

## 2023-01-28 RX ORDER — ACETAMINOPHEN 500 MG
2 TABLET ORAL
Qty: 0 | Refills: 0 | DISCHARGE
Start: 2023-01-28

## 2023-01-28 RX ORDER — ACETAMINOPHEN 500 MG
650 TABLET ORAL EVERY 6 HOURS
Refills: 0 | Status: DISCONTINUED | OUTPATIENT
Start: 2023-01-28 | End: 2023-01-28

## 2023-01-28 RX ORDER — TAMSULOSIN HYDROCHLORIDE 0.4 MG/1
1 CAPSULE ORAL
Qty: 0 | Refills: 0 | DISCHARGE
Start: 2023-01-28

## 2023-01-28 RX ORDER — TAMSULOSIN HYDROCHLORIDE 0.4 MG/1
1 CAPSULE ORAL
Qty: 30 | Refills: 0
Start: 2023-01-28 | End: 2023-02-26

## 2023-01-28 RX ADMIN — APIXABAN 5 MILLIGRAM(S): 2.5 TABLET, FILM COATED ORAL at 18:10

## 2023-01-28 RX ADMIN — APIXABAN 5 MILLIGRAM(S): 2.5 TABLET, FILM COATED ORAL at 06:26

## 2023-01-28 RX ADMIN — Medication 650 MILLIGRAM(S): at 07:28

## 2023-01-28 RX ADMIN — Medication 100 MILLIGRAM(S): at 11:53

## 2023-01-28 RX ADMIN — Medication 650 MILLIGRAM(S): at 06:57

## 2023-01-28 RX ADMIN — POLYETHYLENE GLYCOL 3350 17 GRAM(S): 17 POWDER, FOR SOLUTION ORAL at 11:53

## 2023-01-28 RX ADMIN — AMLODIPINE BESYLATE 5 MILLIGRAM(S): 2.5 TABLET ORAL at 05:17

## 2023-01-28 RX ADMIN — CARBIDOPA AND LEVODOPA 4 TABLET(S): 25; 100 TABLET ORAL at 13:20

## 2023-01-28 RX ADMIN — CARBIDOPA AND LEVODOPA 4 TABLET(S): 25; 100 TABLET ORAL at 05:16

## 2023-01-28 RX ADMIN — PANTOPRAZOLE SODIUM 40 MILLIGRAM(S): 20 TABLET, DELAYED RELEASE ORAL at 06:26

## 2023-01-28 NOTE — DISCHARGE NOTE NURSING/CASE MANAGEMENT/SOCIAL WORK - PATIENT PORTAL LINK FT
You can access the FollowMyHealth Patient Portal offered by [facility] by registering at the following website: [email or web address]. By joining [facility]’s FollowMyHealth portal, you will also be able to view your health information using other applications (apps) compatible with our system.

## 2023-01-30 LAB
ISTAT ARTERIAL BE: -1 MMOL/L — SIGNIFICANT CHANGE UP (ref -2–3)
ISTAT ARTERIAL GLUCOSE: 107 MG/DL — HIGH (ref 70–99)
ISTAT ARTERIAL HCO3: 23 MMOL/L — SIGNIFICANT CHANGE UP (ref 22–26)
ISTAT ARTERIAL HEMATOCRIT: 33 % — LOW (ref 39–50)
ISTAT ARTERIAL HEMOGLOBIN: 11.2 G/DL — LOW (ref 13–17)
ISTAT ARTERIAL IONIZED CALCIUM: 1.27 MMOL/L — SIGNIFICANT CHANGE UP (ref 1.12–1.3)
ISTAT ARTERIAL PCO2: 34 MMHG — LOW (ref 35–45)
ISTAT ARTERIAL PH: 7.44 — SIGNIFICANT CHANGE UP (ref 7.35–7.45)
ISTAT ARTERIAL PO2: 97 MMHG — SIGNIFICANT CHANGE UP (ref 80–105)
ISTAT ARTERIAL POTASSIUM: 3.7 MMOL/L — SIGNIFICANT CHANGE UP (ref 3.5–5.3)
ISTAT ARTERIAL SO2: 98 % — SIGNIFICANT CHANGE UP (ref 95–98)
ISTAT ARTERIAL SODIUM: 135 MMOL/L — SIGNIFICANT CHANGE UP (ref 135–145)
ISTAT ARTERIAL TCO2: 24 MMOL/L — SIGNIFICANT CHANGE UP (ref 22–31)

## 2023-01-31 DIAGNOSIS — I48.91 UNSPECIFIED ATRIAL FIBRILLATION: ICD-10-CM

## 2023-01-31 DIAGNOSIS — I34.0 NONRHEUMATIC MITRAL (VALVE) INSUFFICIENCY: ICD-10-CM

## 2023-01-31 DIAGNOSIS — I48.92 UNSPECIFIED ATRIAL FLUTTER: ICD-10-CM

## 2023-01-31 DIAGNOSIS — Z88.8 ALLERGY STATUS TO OTHER DRUGS, MEDICAMENTS AND BIOLOGICAL SUBSTANCES STATUS: ICD-10-CM

## 2023-01-31 DIAGNOSIS — Z88.1 ALLERGY STATUS TO OTHER ANTIBIOTIC AGENTS STATUS: ICD-10-CM

## 2023-01-31 DIAGNOSIS — I50.32 CHRONIC DIASTOLIC (CONGESTIVE) HEART FAILURE: ICD-10-CM

## 2023-01-31 DIAGNOSIS — R33.9 RETENTION OF URINE, UNSPECIFIED: ICD-10-CM

## 2023-01-31 DIAGNOSIS — Z79.01 LONG TERM (CURRENT) USE OF ANTICOAGULANTS: ICD-10-CM

## 2023-01-31 DIAGNOSIS — E78.5 HYPERLIPIDEMIA, UNSPECIFIED: ICD-10-CM

## 2023-01-31 DIAGNOSIS — R23.0 CYANOSIS: ICD-10-CM

## 2023-01-31 DIAGNOSIS — Z00.6 ENCOUNTER FOR EXAMINATION FOR NORMAL COMPARISON AND CONTROL IN CLINICAL RESEARCH PROGRAM: ICD-10-CM

## 2023-01-31 DIAGNOSIS — I11.0 HYPERTENSIVE HEART DISEASE WITH HEART FAILURE: ICD-10-CM

## 2023-01-31 DIAGNOSIS — G20 PARKINSON'S DISEASE: ICD-10-CM

## 2023-02-01 PROBLEM — E78.5 HYPERLIPIDEMIA, UNSPECIFIED: Chronic | Status: ACTIVE | Noted: 2023-01-25

## 2023-02-01 PROBLEM — G20 PARKINSON'S DISEASE: Chronic | Status: ACTIVE | Noted: 2023-01-25

## 2023-02-01 PROBLEM — I34.0 NONRHEUMATIC MITRAL (VALVE) INSUFFICIENCY: Chronic | Status: ACTIVE | Noted: 2023-01-25

## 2023-02-01 PROBLEM — I50.32 CHRONIC DIASTOLIC (CONGESTIVE) HEART FAILURE: Chronic | Status: ACTIVE | Noted: 2023-01-25

## 2023-02-01 PROBLEM — I48.92 UNSPECIFIED ATRIAL FLUTTER: Chronic | Status: ACTIVE | Noted: 2023-01-25

## 2023-02-01 PROBLEM — I10 ESSENTIAL (PRIMARY) HYPERTENSION: Chronic | Status: ACTIVE | Noted: 2023-01-25

## 2023-02-03 ENCOUNTER — APPOINTMENT (OUTPATIENT)
Dept: HEART AND VASCULAR | Facility: CLINIC | Age: 88
End: 2023-02-03
Payer: MEDICARE

## 2023-02-03 VITALS — WEIGHT: 155 LBS | HEIGHT: 69 IN | TEMPERATURE: 98.7 F | BODY MASS INDEX: 22.96 KG/M2

## 2023-02-03 PROCEDURE — 99214 OFFICE O/P EST MOD 30 MIN: CPT

## 2023-02-03 NOTE — REASON FOR VISIT
[FreeTextEntry1] : 86 y/o M with pmhx of CAD (nonobstructive on cath), permanent afib/flutter (on eliquis), PD, poor functional status (uses walker for balance), HTN, HLD, chronic diastolic heart failure, severe MR now s/p Mitraclip (XTW) who presents for f/u\par His breathing is much improved since procedure, NYHA 1\par no new complaints\par doing well\par \par \par Testing/Imaging Reviewed:\par Cardiac Cath/OP report (Mitraclip)\par Successful [place] of the P2 leaflet flail with 1 XTW- trace residual MR\par nonobstructive CAD\par \par EKG 11/2022- aflutter, irregular HR\par \par TTE 11/2022-\par -LVEF 55%\par -RV size mildly dilated, RV function mildly reduced\par -moderate AI\par -severe MR with posterior MV prolapst\par -Moderate TR, PASP 52mmHg\par \par

## 2023-02-03 NOTE — ASSESSMENT
[FreeTextEntry1] : 88 y/o M with pmhx of CAD (nonobstructive on cath), permanent afib/flutter (on eliquis), PD, poor functional status (uses walker for balance), HTN, HLD, chronic diastolic heart failure, severe MR now s/p Mitraclip (XTW) who presents for f/u\par -TTE and f/u at 1 month in [place]\par -on eliquis, BP/HR controlled\par -medical therapy for CAD (nonobstructive)\par -euvolemic on exam\par -f/u with Dr. Cruz and myself

## 2023-02-03 NOTE — PHYSICAL EXAM
[No Acute Distress] : no acute distress [Frail] : frail [Normal Conjunctiva] : normal conjunctiva [Normal Venous Pressure] : normal venous pressure [No Carotid Bruit] : no carotid bruit [Normal S1, S2] : normal S1, S2 [No Murmur] : no murmur [Clear Lung Fields] : clear lung fields [Good Air Entry] : good air entry [No Respiratory Distress] : no respiratory distress  [Soft] : abdomen soft [Non Tender] : non-tender [No Masses/organomegaly] : no masses/organomegaly [Normal Bowel Sounds] : normal bowel sounds [Abnormal Gait] : abnormal gait [No Edema] : no edema [No Cyanosis] : no cyanosis [No Clubbing] : no clubbing [No Varicosities] : no varicosities [No Rash] : no rash [No Skin Lesions] : no skin lesions [Moves all extremities] : moves all extremities [No Focal Deficits] : no focal deficits [Normal Speech] : normal speech [Alert and Oriented] : alert and oriented [Normal memory] : normal memory [de-identified] : irregular HR

## 2023-02-03 NOTE — REVIEW OF SYSTEMS
[Feeling Fatigued] : feeling fatigued [Seeing Double (Diplopia)] : no diplopia [Earache] : no earache [Dyspnea on exertion] : not dyspnea during exertion [Chest Discomfort] : no chest discomfort [Lower Ext Edema] : no extremity edema [Syncope] : no syncope [Cough] : no cough [Abdominal Pain] : no abdominal pain [Urinary Frequency] : no change in urinary frequency [Joint Pain] : no joint pain [Dizziness] : no dizziness [Confusion] : no confusion was observed [Easy Bleeding] : no tendency for easy bleeding

## 2023-02-15 NOTE — PHYSICAL EXAM
[No Acute Distress] : no acute distress [Frail] : frail [Normal Conjunctiva] : normal conjunctiva [Normal Venous Pressure] : normal venous pressure [No Carotid Bruit] : no carotid bruit [Normal S1, S2] : normal S1, S2 [Good Air Entry] : good air entry [No Respiratory Distress] : no respiratory distress  [Soft] : abdomen soft [Non Tender] : non-tender [No Masses/organomegaly] : no masses/organomegaly [Normal Bowel Sounds] : normal bowel sounds [Abnormal Gait] : abnormal gait [No Edema] : no edema [No Cyanosis] : no cyanosis [No Clubbing] : no clubbing [No Varicosities] : no varicosities [No Rash] : no rash [No Skin Lesions] : no skin lesions [Moves all extremities] : moves all extremities [No Focal Deficits] : no focal deficits [Normal Speech] : normal speech [Alert and Oriented] : alert and oriented [Normal memory] : normal memory [de-identified] : +holosystolic murmur, irregular HR [de-identified] : +crackles at LLL>RLL

## 2023-02-15 NOTE — ASSESSMENT
[FreeTextEntry1] : Chronic diastolic CHF/severe MR/HTN/HLD\par -he appears to have some pulmonary congestions-> starting lasix 40mg daily (x5 days)\par -check labs, BNP today\par -CXR PA/lateral\par -BP relatively controlled, HR WNL\par -His mitral valve is severe and he meets indication for transcatheter repair\par -we discussed at length options for severe MR, including transcatheter mitral valve repair vs surgery vs medical management. Due to his age and comorbidities, patient appears to be a good candidate for TMVr with the MitraClip. We discussed benefits/risks of the MitraClip and will plan for transcatheter repair pending testing. Will plan for cardiac cath with mitraclip (if pt is anatomical candidate for Mitraclip).\par -MASOUD at [facility] (with Dr. Cameron), carotid US\par -TEB with Dr. Bowen\par -continue eliquis (Aflutter/fib), HR controlled\par

## 2023-02-15 NOTE — REVIEW OF SYSTEMS
[Feeling Fatigued] : feeling fatigued [Dyspnea on exertion] : dyspnea during exertion [Seeing Double (Diplopia)] : no diplopia [Earache] : no earache [Chest Discomfort] : no chest discomfort [Lower Ext Edema] : no extremity edema [Syncope] : no syncope [Cough] : no cough [Abdominal Pain] : no abdominal pain [Urinary Frequency] : no change in urinary frequency [Joint Pain] : no joint pain [Dizziness] : no dizziness [Confusion] : no confusion was observed [Easy Bleeding] : no tendency for easy bleeding

## 2023-02-15 NOTE — HISTORY OF PRESENT ILLNESS
[FreeTextEntry1] : 86 y/o M with pmhx of permanent afib/flutter (on eliquis), PD, poor functional status (uses walker for balance), HTN, HLD, chronic diastolic heart failure, severe MR who presents for surgical evaluation. \par \par Pt endorses NYHA III symptoms. Worsening TEE over past few months\par No CHF hospitalizaitons\par He lives at home and has a nurse care for his wife. He is independent with ADLs.\par \par TTE 11/2022-\par -LVEF 55%\par -RV size mildly dilated, RV function mildly reduced\par -moderate AI\par -severe MR with posterior MV prolapst\par -Moderate TR, PASP 52mmHg\par   \par MASOUD \par

## 2023-03-13 ENCOUNTER — APPOINTMENT (OUTPATIENT)
Dept: HEART AND VASCULAR | Facility: CLINIC | Age: 88
End: 2023-03-13
Payer: MEDICARE

## 2023-03-13 PROCEDURE — 99214 OFFICE O/P EST MOD 30 MIN: CPT | Mod: 24

## 2023-03-13 NOTE — ASSESSMENT
[FreeTextEntry1] : 88 y/o M with pmhx of CAD (nonobstructive on cath), permanent afib/flutter (on eliquis), PD, poor functional status (uses walker for balance), HTN, HLD, chronic diastolic heart failure, severe MR now s/p Mitraclip (XTW) who presents for f/u\par -f/u official TTE results today\par -on eliquis, BP/HR controlled\par -medical therapy for CAD (nonobstructive)\par -mild LE-> start lasix x5 days\par -f/u with Dr. Cruz \par -1yr f/u with [facility] \par

## 2023-03-13 NOTE — REASON FOR VISIT
[FreeTextEntry1] : 88 y/o M with pmhx of CAD (nonobstructive on cath), permanent afib/flutter (on eliquis), PD, poor functional status (uses walker for balance), HTN, HLD, chronic diastolic heart failure, severe MR now s/p Mitraclip (XTW) who presents for f/u\par His breathing is much improved since procedure, NYHA 1\par no new complaints (most complaints gait/orthopedic related\par doing well\par Does have some LE edema (slightly worsened)\par \par \par Testing/Imaging Reviewed:\par TTE today- prelim read, normal LVEF, mod AI, mild residual MR (post-[place])\par \par Cardiac Cath/OP report (Mitraclip)\par Successful [place] of the P2 leaflet flail with 1 XTW- trace residual MR\par nonobstructive CAD\par \par EKG 11/2022- aflutter, irregular HR\par \par TTE 11/2022-\par -LVEF 55%\par -RV size mildly dilated, RV function mildly reduced\par -moderate AI\par -severe MR with posterior MV prolapst\par -Moderate TR, PASP 52mmHg\par \par

## 2023-03-13 NOTE — PHYSICAL EXAM
[No Acute Distress] : no acute distress [Frail] : frail [Normal Conjunctiva] : normal conjunctiva [Normal Venous Pressure] : normal venous pressure [No Carotid Bruit] : no carotid bruit [Normal S1, S2] : normal S1, S2 [No Murmur] : no murmur [Clear Lung Fields] : clear lung fields [Good Air Entry] : good air entry [No Respiratory Distress] : no respiratory distress  [Soft] : abdomen soft [Non Tender] : non-tender [No Masses/organomegaly] : no masses/organomegaly [Normal Bowel Sounds] : normal bowel sounds [Abnormal Gait] : abnormal gait [No Edema] : no edema [No Cyanosis] : no cyanosis [No Clubbing] : no clubbing [No Varicosities] : no varicosities [No Rash] : no rash [No Skin Lesions] : no skin lesions [Moves all extremities] : moves all extremities [No Focal Deficits] : no focal deficits [Normal Speech] : normal speech [Alert and Oriented] : alert and oriented [Normal memory] : normal memory [de-identified] : irregular HR

## 2023-03-13 NOTE — REVIEW OF SYSTEMS
[Feeling Fatigued] : feeling fatigued [Seeing Double (Diplopia)] : no diplopia [Earache] : no earache [Dyspnea on exertion] : not dyspnea during exertion [Chest Discomfort] : no chest discomfort [Lower Ext Edema] : lower extremity edema [Syncope] : no syncope [Cough] : no cough [Abdominal Pain] : no abdominal pain [Urinary Frequency] : no change in urinary frequency [Joint Pain] : no joint pain [Dizziness] : no dizziness [Confusion] : no confusion was observed [Easy Bleeding] : no tendency for easy bleeding

## 2023-03-23 ENCOUNTER — TRANSCRIPTION ENCOUNTER (OUTPATIENT)
Age: 88
End: 2023-03-23

## 2023-10-01 PROBLEM — Z92.3 HISTORY OF RADIATION THERAPY: Status: ACTIVE | Noted: 2020-09-14

## 2024-02-23 ENCOUNTER — RESULT REVIEW (OUTPATIENT)
Age: 89
End: 2024-02-23

## 2024-02-28 ENCOUNTER — TRANSCRIPTION ENCOUNTER (OUTPATIENT)
Age: 89
End: 2024-02-28

## 2024-03-08 ENCOUNTER — APPOINTMENT (OUTPATIENT)
Dept: HOME HEALTH SERVICES | Facility: HOME HEALTH | Age: 89
End: 2024-03-08
Payer: MEDICARE

## 2024-03-08 VITALS — OXYGEN SATURATION: 92 % | SYSTOLIC BLOOD PRESSURE: 160 MMHG | DIASTOLIC BLOOD PRESSURE: 90 MMHG | HEART RATE: 85 BPM

## 2024-03-08 DIAGNOSIS — Z71.89 OTHER SPECIFIED COUNSELING: ICD-10-CM

## 2024-03-08 DIAGNOSIS — C61 MALIGNANT NEOPLASM OF PROSTATE: ICD-10-CM

## 2024-03-08 PROCEDURE — G0506: CPT

## 2024-03-08 PROCEDURE — 99344 HOME/RES VST NEW MOD MDM 60: CPT

## 2024-03-08 PROCEDURE — 99497 ADVNCD CARE PLAN 30 MIN: CPT

## 2024-03-08 RX ORDER — AMANTADINE HYDROCHLORIDE 100 1/1
100 TABLET ORAL
Qty: 80 | Refills: 0 | Status: ACTIVE | COMMUNITY
Start: 2024-03-08

## 2024-03-08 RX ORDER — TAMSULOSIN HYDROCHLORIDE 0.4 MG/1
0.4 CAPSULE ORAL
Qty: 180 | Refills: 0 | Status: ACTIVE | COMMUNITY

## 2024-03-08 RX ORDER — APIXABAN 5 MG/1
5 TABLET, FILM COATED ORAL
Qty: 180 | Refills: 3 | Status: ACTIVE | COMMUNITY

## 2024-03-08 RX ORDER — TRAZODONE HYDROCHLORIDE 100 MG/1
100 TABLET ORAL
Qty: 90 | Refills: 2 | Status: ACTIVE | COMMUNITY
Start: 2024-03-08

## 2024-03-08 RX ORDER — CARBIDOPA AND LEVODOPA 25; 100 MG/1; MG/1
25-100 TABLET ORAL 3 TIMES DAILY
Qty: 1080 | Refills: 0 | Status: ACTIVE | COMMUNITY

## 2024-03-08 RX ORDER — FUROSEMIDE 40 MG/1
40 TABLET ORAL DAILY
Qty: 7 | Refills: 0 | Status: DISCONTINUED | COMMUNITY
Start: 2023-03-13 | End: 2024-03-08

## 2024-03-08 RX ORDER — OMEPRAZOLE 40 MG/1
40 CAPSULE, DELAYED RELEASE ORAL
Qty: 90 | Refills: 3 | Status: ACTIVE | COMMUNITY

## 2024-03-08 NOTE — COUNSELING
[ - New patient with 2 or more chronic conditions; CCM discussed and patient-centered care plan established] : New patient with 2 or more chronic conditions; CCM discussed and patient-centered care plan established

## 2024-03-08 NOTE — HISTORY OF PRESENT ILLNESS
[Patient] : patient [FreeTextEntry1] : Gait instability [FreeTextEntry2] : Patient being seen for initial House Calls visit, patient was recently in the hospital for PNA.  Sent home now on oxygen. Patient lives alone with HHA.  Appetite is fair Moving bowels  Urine- partially incontinent In bed for visit skin intact OOB to chair with assistance of HHA No behavioral issues HHA states since using O2 nose has been dry

## 2024-03-08 NOTE — PHYSICAL EXAM
[No Acute Distress] : no acute distress [Normal Sclera/Conjunctiva] : normal sclera/conjunctiva [Normal Outer Ear/Nose] : the ears and nose were normal in appearance [No JVD] : no jugular venous distention [Supple] : the neck was supple [No Respiratory Distress] : no respiratory distress [Normal Rate] : heart rate was normal  [Normal Bowel Sounds] : normal bowel sounds [Non Tender] : non-tender [Soft] : abdomen soft [No Rash] : no rash [No Skin Lesions] : no skin lesions [Oriented x3] : oriented to person, place, and time [de-identified] : diminished on left lower lobe [de-identified] : Irregular/Irregular

## 2024-03-11 ENCOUNTER — APPOINTMENT (OUTPATIENT)
Dept: HEART AND VASCULAR | Facility: CLINIC | Age: 89
End: 2024-03-11

## 2024-03-11 ENCOUNTER — TRANSCRIPTION ENCOUNTER (OUTPATIENT)
Age: 89
End: 2024-03-11

## 2024-03-11 LAB
ALBUMIN SERPL ELPH-MCNC: 2.9 G/DL
ALP BLD-CCNC: 101 U/L
ALT SERPL-CCNC: 10 U/L
ANION GAP SERPL CALC-SCNC: 10 MMOL/L
AST SERPL-CCNC: 12 U/L
BILIRUB SERPL-MCNC: 0.5 MG/DL
BUN SERPL-MCNC: 16 MG/DL
CALCIUM SERPL-MCNC: 8.4 MG/DL
CHLORIDE SERPL-SCNC: 103 MMOL/L
CO2 SERPL-SCNC: 22 MMOL/L
CREAT SERPL-MCNC: 0.78 MG/DL
EGFR: 87 ML/MIN/1.73M2
GLUCOSE SERPL-MCNC: 81 MG/DL
HCT VFR BLD CALC: 26.1 %
HGB BLD-MCNC: 8.4 G/DL
MCHC RBC-ENTMCNC: 30.3 PG
MCHC RBC-ENTMCNC: 32.2 GM/DL
MCV RBC AUTO: 94.2 FL
PLATELET # BLD AUTO: 339 K/UL
POTASSIUM SERPL-SCNC: 4.8 MMOL/L
PROT SERPL-MCNC: 5.3 G/DL
RBC # BLD: 2.77 M/UL
RBC # FLD: 14.3 %
SODIUM SERPL-SCNC: 134 MMOL/L
WBC # FLD AUTO: 9.95 K/UL

## 2024-03-12 ENCOUNTER — APPOINTMENT (OUTPATIENT)
Dept: PULMONOLOGY | Facility: CLINIC | Age: 89
End: 2024-03-12
Payer: MEDICARE

## 2024-03-12 VITALS
OXYGEN SATURATION: 96 % | HEIGHT: 69 IN | BODY MASS INDEX: 21.48 KG/M2 | RESPIRATION RATE: 18 BRPM | TEMPERATURE: 97.3 F | SYSTOLIC BLOOD PRESSURE: 114 MMHG | DIASTOLIC BLOOD PRESSURE: 69 MMHG | WEIGHT: 145 LBS | HEART RATE: 71 BPM

## 2024-03-12 PROCEDURE — 99204 OFFICE O/P NEW MOD 45 MIN: CPT

## 2024-03-12 RX ORDER — BUDESONIDE AND FORMOTEROL FUMARATE DIHYDRATE 160; 4.5 UG/1; UG/1
160-4.5 AEROSOL RESPIRATORY (INHALATION) TWICE DAILY
Qty: 1 | Refills: 3 | Status: DISCONTINUED | COMMUNITY
Start: 2024-03-08 | End: 2024-03-12

## 2024-03-12 RX ORDER — TIOTROPIUM BROMIDE INHALATION SPRAY 3.12 UG/1
2.5 SPRAY, METERED RESPIRATORY (INHALATION) DAILY
Qty: 90 | Refills: 0 | Status: DISCONTINUED | COMMUNITY
Start: 2024-03-08 | End: 2024-03-12

## 2024-03-12 NOTE — ASSESSMENT
[FreeTextEntry1] : # COPD  Pt recently admitted with acute respiratory failure with PNA and sepsis.  Decrease oxygen 1 L NC at rest and with ambulation / exercise 2 L NC.  He was 98% on 3 L and saturation maintained on 1L at rest 96%.  Will wean down slowly.    Pt states the Symbicort not approved by insurance.  Changed inhaler to Stiolto.  Reviewed the CT scan results will follow in a month with repeat CT scan. Pt clinically is improving.    Plan - Change inhaler to Stiolto - Follow on CT scan in one month  - Follow up in a month with PFT after CT scan

## 2024-03-12 NOTE — HISTORY OF PRESENT ILLNESS
[Former] : former [Never] : never [TextBox_4] : 88 yr old male former smoker with Parkinson, prostate CA, COPD, Afib on Eliquis. HTN.  Pt was recently admitted to [facility] 2/22/24.  Pt admitted for acute respiratory failure with hypoxia, PNA, severe sepsis.    Pt started on inhalers (symbicort 160 was not approved and taking Spiriva Respimat)during admission for COPD and given steroids.  Discharged on 2 L NC.  He completed 7 days of ceftriaxone.    He is doing physical therapy 2 times a week.  He uses wheelchair and walker at home at times.  His cough is minimal without mucus production.  Denies fever, wheezing, chest pain.  His fatigue improved and SOB.  He is sleeping well and appetite is getting better.  He does not cough after he eats but sometimes after water.  Having regular BM.    [ESS] : 0

## 2024-03-12 NOTE — PHYSICAL EXAM
[No Acute Distress] : no acute distress [Normal Oropharynx] : normal oropharynx [Normal Appearance] : normal appearance [Normal Rate/Rhythm] : normal rate/rhythm [No Neck Mass] : no neck mass [Normal S1, S2] : normal s1, s2 [No Murmurs] : no murmurs [No Resp Distress] : no resp distress [Clear to Auscultation Bilaterally] : clear to auscultation bilaterally [No Abnormalities] : no abnormalities [Benign] : benign [Normal Gait] : normal gait [No Clubbing] : no clubbing [No Edema] : no edema [No Cyanosis] : no cyanosis [FROM] : FROM [Normal Color/ Pigmentation] : normal color/ pigmentation [No Focal Deficits] : no focal deficits [Oriented x3] : oriented x3 [Normal Affect] : normal affect

## 2024-03-14 DIAGNOSIS — D64.9 ANEMIA, UNSPECIFIED: ICD-10-CM

## 2024-03-15 ENCOUNTER — NON-APPOINTMENT (OUTPATIENT)
Age: 89
End: 2024-03-15

## 2024-03-15 LAB
HCT VFR BLD CALC: 29.6 %
HGB BLD-MCNC: 9.4 G/DL
MCHC RBC-ENTMCNC: 30.1 PG
MCHC RBC-ENTMCNC: 31.8 GM/DL
MCV RBC AUTO: 94.9 FL
PLATELET # BLD AUTO: 310 K/UL
RBC # BLD: 3.12 M/UL
RBC # FLD: 15.2 %
WBC # FLD AUTO: 6.97 K/UL

## 2024-03-21 RX ORDER — TIOTROPIUM BROMIDE AND OLODATEROL 3.124; 2.736 UG/1; UG/1
2.5-2.5 SPRAY, METERED RESPIRATORY (INHALATION) DAILY
Qty: 1 | Refills: 11 | Status: DISCONTINUED | COMMUNITY
Start: 2024-03-12 | End: 2024-03-21

## 2024-04-24 ENCOUNTER — RESULT REVIEW (OUTPATIENT)
Age: 89
End: 2024-04-24

## 2024-04-30 ENCOUNTER — APPOINTMENT (OUTPATIENT)
Dept: HOME HEALTH SERVICES | Facility: HOME HEALTH | Age: 89
End: 2024-04-30

## 2024-04-30 VITALS
HEART RATE: 52 BPM | OXYGEN SATURATION: 96 % | SYSTOLIC BLOOD PRESSURE: 144 MMHG | TEMPERATURE: 98.1 F | RESPIRATION RATE: 18 BRPM | DIASTOLIC BLOOD PRESSURE: 72 MMHG

## 2024-04-30 RX ORDER — FINASTERIDE 5 MG/1
5 TABLET, FILM COATED ORAL DAILY
Refills: 0 | Status: ACTIVE | COMMUNITY
Start: 2024-04-30

## 2024-05-01 ENCOUNTER — NON-APPOINTMENT (OUTPATIENT)
Age: 89
End: 2024-05-01

## 2024-05-07 ENCOUNTER — APPOINTMENT (OUTPATIENT)
Dept: PULMONOLOGY | Facility: CLINIC | Age: 89
End: 2024-05-07
Payer: MEDICARE

## 2024-05-07 VITALS
TEMPERATURE: 97 F | BODY MASS INDEX: 22.81 KG/M2 | WEIGHT: 154 LBS | OXYGEN SATURATION: 93 % | RESPIRATION RATE: 16 BRPM | DIASTOLIC BLOOD PRESSURE: 71 MMHG | SYSTOLIC BLOOD PRESSURE: 118 MMHG | HEART RATE: 70 BPM | HEIGHT: 69 IN

## 2024-05-07 DIAGNOSIS — J18.9 PNEUMONIA, UNSPECIFIED ORGANISM: ICD-10-CM

## 2024-05-07 DIAGNOSIS — R93.89 ABNORMAL FINDINGS ON DIAGNOSTIC IMAGING OF OTHER SPECIFIED BODY STRUCTURES: ICD-10-CM

## 2024-05-07 PROCEDURE — G2211 COMPLEX E/M VISIT ADD ON: CPT

## 2024-05-07 PROCEDURE — 99214 OFFICE O/P EST MOD 30 MIN: CPT

## 2024-05-07 RX ORDER — UMECLIDINIUM BROMIDE AND VILANTEROL TRIFENATATE 62.5; 25 UG/1; UG/1
62.5-25 POWDER RESPIRATORY (INHALATION)
Qty: 3 | Refills: 3 | Status: ACTIVE | COMMUNITY
Start: 2024-03-21 | End: 1900-01-01

## 2024-05-07 NOTE — ASSESSMENT
[FreeTextEntry1] : Acute hypoxic respiratory failure  Resolved  The patient is off the oxygen with adequate oxygen saturation.  At this point we will monitor the  Clinically and will evaluate for next visit for nocturnal hypoxemia.  COPD  There is no clinical need of acute exacerbation of COPD.  No indication for escalation of therapy.  The patient is to continue on Anoro.  PFT was consistent with normal spirometry normal total lung capacity but did decrease in diffusion capacity.  The CT scan of the chest reveals emphysematous changes with cystic disease.  At this point continue on the current regimen  Pneumonia  Resolved clinically but the patient had a course of azithromycin.  Abnormal CT scan of the chest.  I reviewed the CT scan of the chest compared to the previous 1 there is improvement in the areas of groundglass opacity but there is area of prominent reticulation.  There is also an area of a large septated cyst in the right lower lobe.  That interstitial abnormality is consistent with the decrease in the diffusion capacity.  I will repeat the CT scan of the chest and 6 months but at this point we will follow clinically.

## 2024-05-18 ENCOUNTER — TRANSCRIPTION ENCOUNTER (OUTPATIENT)
Age: 89
End: 2024-05-18

## 2024-05-25 ENCOUNTER — TRANSCRIPTION ENCOUNTER (OUTPATIENT)
Age: 89
End: 2024-05-25

## 2024-05-30 ENCOUNTER — APPOINTMENT (OUTPATIENT)
Dept: HOME HEALTH SERVICES | Facility: HOME HEALTH | Age: 89
End: 2024-05-30
Payer: MEDICARE

## 2024-05-30 VITALS — SYSTOLIC BLOOD PRESSURE: 140 MMHG | HEART RATE: 68 BPM | DIASTOLIC BLOOD PRESSURE: 80 MMHG | OXYGEN SATURATION: 92 %

## 2024-05-30 DIAGNOSIS — R09.82 POSTNASAL DRIP: ICD-10-CM

## 2024-05-30 DIAGNOSIS — G20.A1 PARKINSON'S DISEASE WITHOUT DYSKINESIA, WITHOUT MENTION OF FLUCTUATIONS: ICD-10-CM

## 2024-05-30 DIAGNOSIS — I48.20 CHRONIC ATRIAL FIBRILLATION, UNSP: ICD-10-CM

## 2024-05-30 DIAGNOSIS — Z87.09 PERSONAL HISTORY OF OTHER DISEASES OF THE RESPIRATORY SYSTEM: ICD-10-CM

## 2024-05-30 DIAGNOSIS — I10 ESSENTIAL (PRIMARY) HYPERTENSION: ICD-10-CM

## 2024-05-30 DIAGNOSIS — F02.80 PARKINSON'S DISEASE WITHOUT DYSKINESIA, WITHOUT MENTION OF FLUCTUATIONS: ICD-10-CM

## 2024-05-30 DIAGNOSIS — J44.9 CHRONIC OBSTRUCTIVE PULMONARY DISEASE, UNSPECIFIED: ICD-10-CM

## 2024-05-30 PROCEDURE — 99349 HOME/RES VST EST MOD MDM 40: CPT

## 2024-05-30 RX ORDER — FLUTICASONE PROPIONATE 50 UG/1
50 SPRAY, METERED NASAL DAILY
Qty: 1 | Refills: 3 | Status: ACTIVE | COMMUNITY
Start: 2024-05-30 | End: 1900-01-01

## 2024-05-30 NOTE — REASON FOR VISIT
[Initial Evaluation] : an initial evaluation [FreeTextEntry1] : Parkinsons, COPD Continue to Observe Admit to Labor and Delivery

## 2024-05-30 NOTE — PHYSICAL EXAM
[No Acute Distress] : no acute distress [Normal Sclera/Conjunctiva] : normal sclera/conjunctiva [Normal Outer Ear/Nose] : the ears and nose were normal in appearance [No JVD] : no jugular venous distention [Supple] : the neck was supple [No Respiratory Distress] : no respiratory distress [Normal Rate] : heart rate was normal  [Normal Bowel Sounds] : normal bowel sounds [Non Tender] : non-tender [Soft] : abdomen soft [No Rash] : no rash [No Skin Lesions] : no skin lesions [Oriented x3] : oriented to person, place, and time [de-identified] : diminished on left lower lobe [de-identified] : Irregular/Irregular

## 2024-05-30 NOTE — HISTORY OF PRESENT ILLNESS
[Patient] : patient [FreeTextEntry1] : Gait instability [FreeTextEntry2] : Patient being seen for initial House Calls visit, patient c/o cough still from PNA mostly at night . Patient lives alone with HHA.  Appetite is fair Moving bowels  Urine- partially incontinent In wheelchair for visit skin intact OOB to chair with assistance of HHA No behavioral issues HHA states since using O2 nose has been dry

## 2024-06-18 ENCOUNTER — NON-APPOINTMENT (OUTPATIENT)
Age: 89
End: 2024-06-18

## 2024-06-18 ENCOUNTER — APPOINTMENT (OUTPATIENT)
Dept: HOME HEALTH SERVICES | Facility: HOME HEALTH | Age: 89
End: 2024-06-18

## 2024-06-18 VITALS
TEMPERATURE: 97.9 F | SYSTOLIC BLOOD PRESSURE: 120 MMHG | RESPIRATION RATE: 18 BRPM | OXYGEN SATURATION: 96 % | HEART RATE: 75 BPM | DIASTOLIC BLOOD PRESSURE: 72 MMHG

## 2024-06-18 DIAGNOSIS — R60.9 EDEMA, UNSPECIFIED: ICD-10-CM

## 2024-06-18 DIAGNOSIS — S81.812A LACERATION W/OUT FOREIGN BODY, LEFT LOWER LEG, INITIAL ENCOUNTER: ICD-10-CM

## 2024-06-18 RX ORDER — SILVER SULFADIAZINE 10 MG/G
1 CREAM TOPICAL DAILY
Qty: 1 | Refills: 2 | Status: ACTIVE | COMMUNITY
Start: 2024-06-18 | End: 1900-01-01

## 2024-06-18 RX ORDER — FUROSEMIDE 20 MG/1
20 TABLET ORAL DAILY
Qty: 4 | Refills: 0 | Status: ACTIVE | COMMUNITY
Start: 2024-06-18 | End: 1900-01-01

## 2024-06-18 RX ORDER — AZITHROMYCIN 250 MG/1
250 TABLET, FILM COATED ORAL
Qty: 1 | Refills: 0 | Status: DISCONTINUED | COMMUNITY
Start: 2024-05-02 | End: 2024-06-18

## 2024-06-18 RX ORDER — AMOXICILLIN AND CLAVULANATE POTASSIUM 875; 125 MG/1; MG/1
875-125 TABLET, COATED ORAL
Qty: 14 | Refills: 0 | Status: DISCONTINUED | COMMUNITY
Start: 2024-05-18 | End: 2024-06-18

## 2024-06-21 ENCOUNTER — NON-APPOINTMENT (OUTPATIENT)
Age: 89
End: 2024-06-21

## 2024-06-24 NOTE — PROGRESS NOTE ADULT - PROVIDER SPECIALTY LIST ADULT
Hello! It looks like the [place] follow up was given - could we send the family the [place] initial evaluation parent form?  Structural Heart

## 2024-07-05 ENCOUNTER — NON-APPOINTMENT (OUTPATIENT)
Age: 89
End: 2024-07-05

## 2024-07-05 DIAGNOSIS — R05.9 COUGH, UNSPECIFIED: ICD-10-CM

## 2024-07-05 RX ORDER — BENZONATATE 100 MG/1
100 CAPSULE ORAL
Qty: 21 | Refills: 0 | Status: ACTIVE | COMMUNITY
Start: 2024-07-05 | End: 1900-01-01

## 2024-07-08 ENCOUNTER — NON-APPOINTMENT (OUTPATIENT)
Age: 89
End: 2024-07-08

## 2024-07-08 RX ORDER — AMOXICILLIN AND CLAVULANATE POTASSIUM 875; 125 MG/1; MG/1
875-125 TABLET, COATED ORAL
Qty: 1 | Refills: 0 | Status: ACTIVE | COMMUNITY
Start: 2024-07-08 | End: 1900-01-01

## 2024-07-09 ENCOUNTER — APPOINTMENT (OUTPATIENT)
Dept: HOME HEALTH SERVICES | Facility: HOME HEALTH | Age: 89
End: 2024-07-09

## 2024-07-09 VITALS
RESPIRATION RATE: 18 BRPM | HEART RATE: 88 BPM | OXYGEN SATURATION: 94 % | TEMPERATURE: 98.5 F | DIASTOLIC BLOOD PRESSURE: 86 MMHG | SYSTOLIC BLOOD PRESSURE: 128 MMHG

## 2024-07-13 ENCOUNTER — NON-APPOINTMENT (OUTPATIENT)
Age: 89
End: 2024-07-13

## 2024-07-22 ENCOUNTER — NON-APPOINTMENT (OUTPATIENT)
Age: 89
End: 2024-07-22

## 2024-07-22 ENCOUNTER — RX CHANGE (OUTPATIENT)
Age: 89
End: 2024-07-22

## 2024-07-22 DIAGNOSIS — R30.0 DYSURIA: ICD-10-CM

## 2024-07-22 DIAGNOSIS — R41.0 DISORIENTATION, UNSPECIFIED: ICD-10-CM

## 2024-07-22 RX ORDER — ALBUTEROL SULFATE 2.5 MG/3ML
(2.5 MG/3ML) SOLUTION RESPIRATORY (INHALATION) EVERY 6 HOURS
Qty: 3 | Refills: 3 | Status: ACTIVE | COMMUNITY
Start: 2024-07-22 | End: 1900-01-01

## 2024-07-22 RX ORDER — FLUTICASONE PROPIONATE 50 UG/1
50 SPRAY, METERED NASAL
Qty: 24 | Refills: 3 | Status: ACTIVE | COMMUNITY
Start: 1900-01-01 | End: 1900-01-01

## 2024-07-23 ENCOUNTER — LABORATORY RESULT (OUTPATIENT)
Age: 89
End: 2024-07-23

## 2024-07-23 ENCOUNTER — RESULT REVIEW (OUTPATIENT)
Age: 89
End: 2024-07-23

## 2024-07-23 ENCOUNTER — TRANSCRIPTION ENCOUNTER (OUTPATIENT)
Age: 89
End: 2024-07-23

## 2024-07-23 ENCOUNTER — NON-APPOINTMENT (OUTPATIENT)
Age: 89
End: 2024-07-23

## 2024-07-24 LAB
APPEARANCE: CLEAR
BILIRUBIN URINE: ABNORMAL
BLOOD URINE: NEGATIVE
COLOR: NORMAL
GLUCOSE QUALITATIVE U: NEGATIVE MG/DL
KETONES URINE: ABNORMAL MG/DL
LEUKOCYTE ESTERASE URINE: ABNORMAL
NITRITE URINE: NEGATIVE
PH URINE: 5.5
PROTEIN URINE: 100 MG/DL
SPECIFIC GRAVITY URINE: 1.03
UROBILINOGEN URINE: 1 MG/DL

## 2024-07-26 ENCOUNTER — RESULT REVIEW (OUTPATIENT)
Age: 89
End: 2024-07-26

## 2024-08-01 ENCOUNTER — TRANSCRIPTION ENCOUNTER (OUTPATIENT)
Age: 89
End: 2024-08-01

## 2024-08-05 ENCOUNTER — TRANSCRIPTION ENCOUNTER (OUTPATIENT)
Age: 89
End: 2024-08-05

## 2024-08-07 ENCOUNTER — APPOINTMENT (OUTPATIENT)
Dept: HOME HEALTH SERVICES | Facility: HOME HEALTH | Age: 89
End: 2024-08-07

## 2024-08-07 PROBLEM — Z97.8 CHRONIC INDWELLING FOLEY CATHETER: Status: ACTIVE | Noted: 2024-08-07

## 2024-08-07 PROBLEM — R13.12 DYSPHAGIA, OROPHARYNGEAL: Status: ACTIVE | Noted: 2024-08-07

## 2024-08-07 PROBLEM — R68.89 EXCESSIVE ORAL SECRETIONS: Status: ACTIVE | Noted: 2024-08-07

## 2024-08-07 PROBLEM — Z51.5 HOSPICE CARE PATIENT: Status: ACTIVE | Noted: 2024-08-07

## 2024-08-07 PROCEDURE — 99496 TRANSJ CARE MGMT HIGH F2F 7D: CPT | Mod: GV

## 2024-08-07 NOTE — REASON FOR VISIT
[Post-hospitalization from ___ Hospital] : Post-hospitalization from [unfilled] Hospital [Admitted on: ___] : The patient was admitted on [unfilled] [Discharged on ___] : discharged on [unfilled] [Discharge Summary] : discharge summary [FreeTextEntry2] : Patient was admitted to [facility] for respiratory failure, was intubated and then extubated.  Patient now on puree diet with thickened liquids. Now with indwelling garduno catheter. Patient discharged home with hospice in place.

## 2024-08-07 NOTE — PHYSICAL EXAM
[No Acute Distress] : no acute distress [Normal Sclera/Conjunctiva] : normal sclera/conjunctiva [Normal Outer Ear/Nose] : the ears and nose were normal in appearance [No JVD] : no jugular venous distention [No Respiratory Distress] : no respiratory distress [Normal Rate] : heart rate was normal  [Normal Bowel Sounds] : normal bowel sounds [Non Tender] : non-tender [No HSM] : no hepato-splenomegaly [de-identified] : crackles at bases [de-identified] : Irregular/Irregular [de-identified] : responsive to verbal stimuli

## 2024-08-07 NOTE — HISTORY OF PRESENT ILLNESS
[Formal Caregiver] : formal caregiver [FreeTextEntry1] : Bed bound [FreeTextEntry2] : Patient being seen post discharge, patient is now bed bound andwith garduno in place. BM yesterday Hospice following No falls Sleeping well Thickened liquids with puree foods.  Hospice RN at the home yesterday

## 2024-08-08 ENCOUNTER — APPOINTMENT (OUTPATIENT)
Dept: PULMONOLOGY | Facility: CLINIC | Age: 89
End: 2024-08-08

## 2024-09-16 ENCOUNTER — RX CHANGE (OUTPATIENT)
Age: 89
End: 2024-09-16

## 2024-09-16 RX ORDER — FLUTICASONE PROPIONATE 50 UG/1
50 SPRAY, METERED NASAL
Qty: 24 | Refills: 3 | Status: ACTIVE | COMMUNITY
Start: 1900-01-01 | End: 1900-01-01

## (undated) DEVICE — BAND RADIAL COMPRESSION DEVICE REG 24CM

## (undated) DEVICE — MANIFOLD ANGIO AUTOMD TRNDCR

## (undated) DEVICE — NDL TRANSSEPTAL BRK 18GAX98CM

## (undated) DEVICE — DRAPE SMALL W ADHESIVE APERTURE

## (undated) DEVICE — WARMING BLANKET LOWER ADULT

## (undated) DEVICE — TUBING EXTENSION HI PRESSURE FLEX 48"

## (undated) DEVICE — SYR MED A2000 SYRINGE KIT ACIST REUS

## (undated) DEVICE — SUT VICRYL 2-0 27" CT-1

## (undated) DEVICE — PACK COR LT HEART

## (undated) DEVICE — VASCULAR DILATOR KIT 8,12,16,20, 24FR

## (undated) DEVICE — VENODYNE/SCD SLEEVE CALF MEDIUM

## (undated) DEVICE — NDL TRANSEPTAL BRK-1 98CM

## (undated) DEVICE — DRAPE ULTRASOUND TRANSDUCER COVER

## (undated) DEVICE — PACK HYBRID LHH

## (undated) DEVICE — GLV 6.5 PROTEXIS (WHITE)

## (undated) DEVICE — ELCTR BOVIE PENCIL HANDPIECE ROCKER SWITCH 15FT

## (undated) DEVICE — CATH CARDIAC RT CUSET 601201319

## (undated) DEVICE — GLV 7.5 PROTEXIS (WHITE)

## (undated) DEVICE — GLV 7 PROTEXIS (WHITE)

## (undated) DEVICE — TUBING FLUID ADMINISTRATION SET PRIM 70"

## (undated) DEVICE — SYS DEL CONTROLLER ANGIOTOUCH

## (undated) DEVICE — NDL PERCU ECHOTIP 21G X 4CM

## (undated) DEVICE — ELCTR ZOLL DEFIBRILLATOR PAD NO REPLACEMENT

## (undated) DEVICE — PACK CATH CARDIAC KIT DSG SCSR FRCP

## (undated) DEVICE — SPIKE MINI STER

## (undated) DEVICE — ELCTR REM POLYHESIVE ADULT PT RETURN 15FT